# Patient Record
Sex: FEMALE | Race: BLACK OR AFRICAN AMERICAN | Employment: UNEMPLOYED | URBAN - METROPOLITAN AREA
[De-identification: names, ages, dates, MRNs, and addresses within clinical notes are randomized per-mention and may not be internally consistent; named-entity substitution may affect disease eponyms.]

---
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
## 2021-04-05 ENCOUNTER — APPOINTMENT (OUTPATIENT)
Dept: CT IMAGING | Age: 21
End: 2021-04-05
Payer: COMMERCIAL

## 2021-04-05 ENCOUNTER — HOSPITAL ENCOUNTER (EMERGENCY)
Age: 21
Discharge: ANOTHER HOSPITAL | End: 2021-04-06
Attending: EMERGENCY MEDICINE
Payer: COMMERCIAL

## 2021-04-05 ENCOUNTER — APPOINTMENT (OUTPATIENT)
Dept: GENERAL RADIOLOGY | Age: 21
End: 2021-04-05
Payer: COMMERCIAL

## 2021-04-05 DIAGNOSIS — F32.A DEPRESSION, UNSPECIFIED DEPRESSION TYPE: Primary | ICD-10-CM

## 2021-04-05 PROBLEM — F29 PSYCHOSIS (HCC): Status: ACTIVE | Noted: 2021-04-05

## 2021-04-05 LAB
A/G RATIO: 1.3 (ref 1.1–2.2)
ACETAMINOPHEN LEVEL: <5 UG/ML (ref 10–30)
ALBUMIN SERPL-MCNC: 4.7 G/DL (ref 3.4–5)
ALP BLD-CCNC: 61 U/L (ref 40–129)
ALT SERPL-CCNC: 16 U/L (ref 10–40)
AMPHETAMINE SCREEN, URINE: NORMAL
ANION GAP SERPL CALCULATED.3IONS-SCNC: 15 MMOL/L (ref 3–16)
AST SERPL-CCNC: 15 U/L (ref 15–37)
BACTERIA: ABNORMAL /HPF
BARBITURATE SCREEN URINE: NORMAL
BASOPHILS ABSOLUTE: 0.1 K/UL (ref 0–0.2)
BASOPHILS RELATIVE PERCENT: 0.7 %
BENZODIAZEPINE SCREEN, URINE: NORMAL
BILIRUB SERPL-MCNC: 0.4 MG/DL (ref 0–1)
BILIRUBIN URINE: ABNORMAL
BLOOD, URINE: ABNORMAL
BUN BLDV-MCNC: 15 MG/DL (ref 7–20)
CALCIUM SERPL-MCNC: 10 MG/DL (ref 8.3–10.6)
CANNABINOID SCREEN URINE: NORMAL
CHLORIDE BLD-SCNC: 102 MMOL/L (ref 99–110)
CLARITY: ABNORMAL
CO2: 25 MMOL/L (ref 21–32)
COCAINE METABOLITE SCREEN URINE: NORMAL
COLOR: ABNORMAL
COMMENT UA: ABNORMAL
CREAT SERPL-MCNC: 0.8 MG/DL (ref 0.6–1.1)
EOSINOPHILS ABSOLUTE: 0 K/UL (ref 0–0.6)
EOSINOPHILS RELATIVE PERCENT: 0.1 %
EPITHELIAL CELLS, UA: 18 /HPF (ref 0–5)
ETHANOL: NORMAL MG/DL (ref 0–0.08)
GFR AFRICAN AMERICAN: >60
GFR NON-AFRICAN AMERICAN: >60
GLOBULIN: 3.6 G/DL
GLUCOSE BLD-MCNC: 79 MG/DL (ref 70–99)
GLUCOSE BLD-MCNC: 95 MG/DL (ref 70–99)
GLUCOSE URINE: NEGATIVE MG/DL
HCG(URINE) PREGNANCY TEST: NEGATIVE
HCT VFR BLD CALC: 42.4 % (ref 36–48)
HEMOGLOBIN: 14.2 G/DL (ref 12–16)
KETONES, URINE: >=80 MG/DL
LEUKOCYTE ESTERASE, URINE: NEGATIVE
LYMPHOCYTES ABSOLUTE: 1.5 K/UL (ref 1–5.1)
LYMPHOCYTES RELATIVE PERCENT: 18.6 %
Lab: NORMAL
MCH RBC QN AUTO: 27.9 PG (ref 26–34)
MCHC RBC AUTO-ENTMCNC: 33.6 G/DL (ref 31–36)
MCV RBC AUTO: 83 FL (ref 80–100)
METHADONE SCREEN, URINE: NORMAL
MICROSCOPIC EXAMINATION: YES
MONOCYTES ABSOLUTE: 0.6 K/UL (ref 0–1.3)
MONOCYTES RELATIVE PERCENT: 8.2 %
MUCUS: ABNORMAL /LPF
NEUTROPHILS ABSOLUTE: 5.7 K/UL (ref 1.7–7.7)
NEUTROPHILS RELATIVE PERCENT: 72.4 %
NITRITE, URINE: NEGATIVE
OPIATE SCREEN URINE: NORMAL
OXYCODONE URINE: NORMAL
PDW BLD-RTO: 13.6 % (ref 12.4–15.4)
PERFORMED ON: NORMAL
PH UA: 5.5
PH UA: 5.5 (ref 5–8)
PHENCYCLIDINE SCREEN URINE: NORMAL
PLATELET # BLD: 277 K/UL (ref 135–450)
PMV BLD AUTO: 9.2 FL (ref 5–10.5)
POTASSIUM SERPL-SCNC: 3.4 MMOL/L (ref 3.5–5.1)
PROPOXYPHENE SCREEN: NORMAL
PROTEIN UA: 30 MG/DL
RBC # BLD: 5.1 M/UL (ref 4–5.2)
RBC UA: 2 /HPF (ref 0–4)
SALICYLATE, SERUM: <0.3 MG/DL (ref 15–30)
SARS-COV-2, NAAT: NOT DETECTED
SODIUM BLD-SCNC: 142 MMOL/L (ref 136–145)
SPECIFIC GRAVITY UA: >1.03 (ref 1–1.03)
TOTAL PROTEIN: 8.3 G/DL (ref 6.4–8.2)
URINE REFLEX TO CULTURE: ABNORMAL
URINE TYPE: ABNORMAL
UROBILINOGEN, URINE: 1 E.U./DL
WBC # BLD: 7.8 K/UL (ref 4–11)
WBC UA: 8 /HPF (ref 0–5)

## 2021-04-05 PROCEDURE — 87635 SARS-COV-2 COVID-19 AMP PRB: CPT

## 2021-04-05 PROCEDURE — 6370000000 HC RX 637 (ALT 250 FOR IP): Performed by: EMERGENCY MEDICINE

## 2021-04-05 PROCEDURE — 93005 ELECTROCARDIOGRAM TRACING: CPT | Performed by: EMERGENCY MEDICINE

## 2021-04-05 PROCEDURE — 71045 X-RAY EXAM CHEST 1 VIEW: CPT

## 2021-04-05 PROCEDURE — 70450 CT HEAD/BRAIN W/O DYE: CPT

## 2021-04-05 PROCEDURE — 84703 CHORIONIC GONADOTROPIN ASSAY: CPT

## 2021-04-05 PROCEDURE — 81001 URINALYSIS AUTO W/SCOPE: CPT

## 2021-04-05 PROCEDURE — 80179 DRUG ASSAY SALICYLATE: CPT

## 2021-04-05 PROCEDURE — 85025 COMPLETE CBC W/AUTO DIFF WBC: CPT

## 2021-04-05 PROCEDURE — 36415 COLL VENOUS BLD VENIPUNCTURE: CPT

## 2021-04-05 PROCEDURE — 6360000004 HC RX CONTRAST MEDICATION: Performed by: EMERGENCY MEDICINE

## 2021-04-05 PROCEDURE — 99283 EMERGENCY DEPT VISIT LOW MDM: CPT

## 2021-04-05 PROCEDURE — 80307 DRUG TEST PRSMV CHEM ANLYZR: CPT

## 2021-04-05 PROCEDURE — 80053 COMPREHEN METABOLIC PANEL: CPT

## 2021-04-05 PROCEDURE — 80143 DRUG ASSAY ACETAMINOPHEN: CPT

## 2021-04-05 PROCEDURE — 70498 CT ANGIOGRAPHY NECK: CPT

## 2021-04-05 PROCEDURE — 82077 ASSAY SPEC XCP UR&BREATH IA: CPT

## 2021-04-05 RX ORDER — ACETAMINOPHEN 500 MG
1000 TABLET ORAL ONCE
Status: COMPLETED | OUTPATIENT
Start: 2021-04-05 | End: 2021-04-05

## 2021-04-05 RX ADMIN — ACETAMINOPHEN 1000 MG: 500 TABLET ORAL at 23:36

## 2021-04-05 RX ADMIN — IOPAMIDOL 75 ML: 755 INJECTION, SOLUTION INTRAVENOUS at 19:57

## 2021-04-05 ASSESSMENT — PAIN SCALES - GENERAL: PAINLEVEL_OUTOF10: 8

## 2021-04-05 NOTE — ED PROVIDER NOTES
[facility]  eMERGENCY dEPARTMENT eNCOUnter      Pt Name: Familia Paniagua  MRN: [id number]  Maria Luisagfmicah 2000  Date of evaluation: 4/5/2021  Provider: Kim Jalloh MD    CHIEF COMPLAINT       Chief Complaint   Patient presents with    Aphasia     per mom, patient has been progressively slurring words since Friday; when asked if she knows what she wants to say something and is just unable to, she nods; \"in and out for two weeks (talk and then wont talk)\"         CRITICAL CARE TIME   Total Critical Care time was 0 minutes, excluding separately reportable procedures. There was a high probability of clinically significant/life threatening deterioration in the patient's condition which required my urgent intervention. HISTORY OF PRESENT ILLNESS  (Location/Symptom, Timing/Onset, Context/Setting, Quality, Duration, Modifying Factors, Severity.)   Familia Paniagua is a 24 y.o. female who presents to the emergency department with her mother stating that she has been acting normal for the last 2 weeks. She states she seems withdrawn. She is frequently found staring. She has noticed that some nights she does not seem to be sleeping. Is not been speaking as much as usual.  Her mother feels that on Friday she noticed her slurring her speech and she has been slurring it since then. She took her to [facility] today. Her mother states that they were there for so long that she decided to leave and bring her here. Her mother states she has been eating, but not as much as usual.  She is not aware of any drug use. She has a history of depression and is on Celexa. States her sister was ready to leave for school and saw her standing at a window staring. She came home a couple hours later after taking a test at school and she was still standing in the same spot staring out the window. When she arrived here the patient would not talk to me at all.   He would shake her file    Intimate partner violence     Fear of current or ex partner: Not on file     Emotionally abused: Not on file     Physically abused: Not on file     Forced sexual activity: Not on file   Other Topics Concern    Not on file   Social History Narrative    Not on file         PHYSICAL EXAM    (up to 7 for level 4, 8 or more for level 5)     ED Triage Vitals [04/05/21 1823]   BP Temp Temp Source Pulse Resp SpO2 Height Weight   (!) 168/98 97.8 °F (36.6 °C) Temporal 107 20 100 % -- --       General: Alert morbidly obese black female no acute distress. Head: Atraumatic and normocephalic. Eyes: No conjunctival injection. No pallor. Pupils equal round reactive. Extraocular movements are intact. ENT: TMs are normal.  Nose is clear. Oropharynx is moist without erythema. No facial asymmetry. Neck: Supple, nontender, no adenopathy. Heart: Regular rate and rhythm. No murmurs or gallops noted. Lungs: Breath sounds equal bilaterally and clear. Abdomen: Soft, nondistended, obese. No apparent tenderness. No palpable masses organomegaly. Musculoskeletal: No lower extremity edema. Intact symmetrical distal pulses. Skin: Warm and dry, good turgor. No pallor or cyanosis. No diaphoresis. Neuro: Awake, alert, cooperates for exam.  She will not verbalize. Symmetrical reactive pupils. Intact extraocular movements. No facial asymmetry. Symmetrical motor function upper and lower extremities. Ambulates normally. Mental status: Per mother no expressions of suicidal homicidal ideations. Cannot assess further because she refuses to speak. DIFFERENTIAL DIAGNOSIS   Differential includes but is not limited to CVA, drug use/abuse, major depression, other psychiatric disorder. DIAGNOSTIC RESULTS     EKG: All EKG's are interpreted by Erwin Marie MD in the absence of a cardiologist.    Sinus tachycardia, rate of 104, no acute ST-T wave changes.   Rhythm strip shows sinus tachycardia with a rate of 104, DE interval 148 ms, QRS 72 ms with no other ectopy as interpreted by me. No old EKG available for comparison. RADIOLOGY:   Non-plain film images such as CT, Ultrasound and MRI are read by the radiologist. Plain radiographic images are visualized and preliminarily interpreted Bobbi Wright MD with the below findings:      Interpretation per the Radiologist below, if available at the time of this note:     [address]   Final Result   1. No acute intracranial abnormality. 2. Unremarkable CTA of the head and neck. CT HEAD WO CONTRAST   Final Result   1. No acute intracranial abnormality. 2. Unremarkable CTA of the head and neck.          XR CHEST PORTABLE   Final Result   No acute abnormality               ED BEDSIDE ULTRASOUND:   Performed by ED Physician - none    LABS:  Labs Reviewed   COMPREHENSIVE METABOLIC PANEL - Abnormal; Notable for the following components:       Result Value    Potassium 3.4 (*)     Total Protein 8.3 (*)     All other components within normal limits    Narrative:     Performed at:  [facility]  [address], [place] [address]   Phone [phone]   URINE RT REFLEX TO CULTURE - Abnormal; Notable for the following components:    Clarity, UA CLOUDY (*)     Bilirubin Urine SMALL (*)     Ketones, Urine >=80 (*)     Blood, Urine LARGE (*)     Protein, UA 30 (*)     All other components within normal limits    Narrative:     Performed at:  [facility]  [address], [place] [address]   Phone [phone]   ACETAMINOPHEN LEVEL - Abnormal; Notable for the following components:    Acetaminophen Level <5 (*)     All other components within normal limits    Narrative:     Performed at:  [facility]  [address], [place] [employer] [employer] [address]   Phone [phone]   SALICYLATE LEVEL - Abnormal; Notable for the following components: Salicylate, Serum <6.6 (*)     All other components within normal limits    Narrative:     Performed at:  [address] [employer] [address]   Phone [phone]   MICROSCOPIC URINALYSIS - Abnormal; Notable for the following components:    Mucus, UA 2+ (*)     Bacteria, UA 2+ (*)     WBC, UA 8 (*)     Epithelial Cells, UA 18 (*)     All other components within normal limits    Narrative:     Performed at:  [address] [facility] [place] [address]   Phone [phone], RAPID    Narrative:     Performed at:  [address] [facility] [employer] [place] [address]   Phone [phone]   CBC WITH AUTO DIFFERENTIAL    Narrative:     Performed at:  [facility]  [address] [place] [address] [employer] [place] [address]   Phone [phone]   PREGNANCY, URINE    Narrative:     Performed at:  [address] [facility] [employer] [address]   Phone [phone]   ETHANOL    Narrative:     Performed at:  [facility]  [address] [place] [address] [facility] [employer] [address]   Phone [phone]   URINE DRUG SCREEN    Narrative:     Performed at:  [facility]  [address] [place] [address] [employer] [address]   Phone [phone]   POCT GLUCOSE    Narrative:     Performed at:  [facility]  [address] [place] [address] [facility] [employer] [address]   Phone [phone]       All other labs were within normal range or not returned as of this dictation. EMERGENCY DEPARTMENT COURSE and DIFFERENTIAL DIAGNOSIS/MDM:   Vitals:    Vitals:    04/05/21 1823   BP: (!) 168/98   Pulse: 107   Resp: 20   Temp: 97.8 °F (36.6 °C)   TempSrc: Temporal   SpO2: 100%       This patient presents as above with symptoms that started about 2 weeks ago.   She does have a past history of depression, but her mother says she has never had symptoms like this. She has become more withdrawn, she is not speaking as much. Not eating as much. Sometimes at night she is not sleeping. They find her standing and staring at times. Over the last few days she has been verbalizing less and less. When she came in initially I was not able to her to speak to me. When I went in to reevaluate her she would whisper very softly to the point where I could just barely understand her. Her medical work-up is unremarkable including a CT head, CT angiogram head and neck. Drug screen is negative. I suspect the symptoms are related to depression. I will consult psychiatry for admission for further evaluation and treatment. Test results, diagnosis, and treatment plan were discussed with the patient and her mother. They understand the treatment plan and need for admission and are agreeable. The patient is medically cleared for psychiatric admission. 2300:  Discussed with Dr. Hubert Joyner at shift change. He will disposition when accepted by Psychiatry. CONSULTS:  IP CONSULT TO PSYCHIATRY    PROCEDURES:  None    FINAL IMPRESSION      1. Depression, unspecified depression type          DISPOSITION/PLAN   DISPOSITION        PATIENT REFERRED TO:  No follow-up provider specified.     DISCHARGE MEDICATIONS:  New Prescriptions    No medications on file       (Please note that portions of this note were completed with a voice recognition program.  Efforts were made to edit the dictations but occasionally words are mis-transcribed.)    Eric St MD  Attending Emergency Physician        Fidelia Barnett MD  04/05/21 7307       Fidelia Barnett MD  04/05/21 0520

## 2021-04-06 ENCOUNTER — HOSPITAL ENCOUNTER (INPATIENT)
Age: 21
LOS: 6 days | Discharge: HOME | DRG: 750 | End: 2021-04-12
Attending: PSYCHIATRY & NEUROLOGY | Admitting: PSYCHIATRY & NEUROLOGY
Payer: COMMERCIAL

## 2021-04-06 VITALS
OXYGEN SATURATION: 100 % | TEMPERATURE: 97.8 F | HEART RATE: 120 BPM | WEIGHT: 293 LBS | SYSTOLIC BLOOD PRESSURE: 174 MMHG | BODY MASS INDEX: 49.38 KG/M2 | RESPIRATION RATE: 15 BRPM | DIASTOLIC BLOOD PRESSURE: 88 MMHG

## 2021-04-06 DIAGNOSIS — R82.4 KETONURIA: ICD-10-CM

## 2021-04-06 DIAGNOSIS — N76.0 BACTERIAL VAGINOSIS: ICD-10-CM

## 2021-04-06 DIAGNOSIS — E66.01 MORBID OBESITY (HCC): ICD-10-CM

## 2021-04-06 DIAGNOSIS — R03.0 ELEVATED BP WITHOUT DIAGNOSIS OF HYPERTENSION: ICD-10-CM

## 2021-04-06 DIAGNOSIS — B96.89 BACTERIAL VAGINOSIS: ICD-10-CM

## 2021-04-06 DIAGNOSIS — F20.81 SCHIZOPHRENIFORM DISORDER (HCC): Primary | ICD-10-CM

## 2021-04-06 LAB
BACTERIA WET PREP: ABNORMAL
CLUE CELLS: ABNORMAL
EKG ATRIAL RATE: 104 BPM
EKG DIAGNOSIS: NORMAL
EKG P AXIS: 57 DEGREES
EKG P-R INTERVAL: 148 MS
EKG Q-T INTERVAL: 346 MS
EKG QRS DURATION: 72 MS
EKG QTC CALCULATION (BAZETT): 454 MS
EKG R AXIS: 48 DEGREES
EKG T AXIS: 20 DEGREES
EKG VENTRICULAR RATE: 104 BPM
EPITHELIAL CELLS WET PREP: ABNORMAL
RBC WET PREP: ABNORMAL
SOURCE WET PREP: ABNORMAL
TRICHOMONAS PREP: ABNORMAL
WBC WET PREP: ABNORMAL
YEAST WET PREP: ABNORMAL

## 2021-04-06 PROCEDURE — 6360000002 HC RX W HCPCS: Performed by: PSYCHIATRY & NEUROLOGY

## 2021-04-06 PROCEDURE — 87491 CHLMYD TRACH DNA AMP PROBE: CPT

## 2021-04-06 PROCEDURE — 1240000000 HC EMOTIONAL WELLNESS R&B

## 2021-04-06 PROCEDURE — 6370000000 HC RX 637 (ALT 250 FOR IP): Performed by: PSYCHIATRY & NEUROLOGY

## 2021-04-06 PROCEDURE — 87591 N.GONORRHOEAE DNA AMP PROB: CPT

## 2021-04-06 PROCEDURE — 87210 SMEAR WET MOUNT SALINE/INK: CPT

## 2021-04-06 PROCEDURE — 93010 ELECTROCARDIOGRAM REPORT: CPT | Performed by: INTERNAL MEDICINE

## 2021-04-06 RX ORDER — ALBUTEROL SULFATE 90 UG/1
4 AEROSOL, METERED RESPIRATORY (INHALATION) EVERY 4 HOURS PRN
Status: ON HOLD | COMMUNITY
Start: 2021-03-09 | End: 2021-04-12 | Stop reason: HOSPADM

## 2021-04-06 RX ORDER — LORAZEPAM 1 MG/1
1 TABLET ORAL EVERY 4 HOURS PRN
Status: DISCONTINUED | OUTPATIENT
Start: 2021-04-06 | End: 2021-04-10

## 2021-04-06 RX ORDER — IBUPROFEN 400 MG/1
400 TABLET ORAL EVERY 6 HOURS PRN
Status: DISCONTINUED | OUTPATIENT
Start: 2021-04-06 | End: 2021-04-10

## 2021-04-06 RX ORDER — CLONIDINE HYDROCHLORIDE 0.1 MG/1
0.1 TABLET ORAL ONCE
Status: COMPLETED | OUTPATIENT
Start: 2021-04-06 | End: 2021-04-06

## 2021-04-06 RX ORDER — LORAZEPAM 2 MG/ML
1 INJECTION INTRAMUSCULAR EVERY 4 HOURS PRN
Status: DISCONTINUED | OUTPATIENT
Start: 2021-04-06 | End: 2021-04-10

## 2021-04-06 RX ORDER — OLANZAPINE 10 MG/1
10 INJECTION, POWDER, LYOPHILIZED, FOR SOLUTION INTRAMUSCULAR EVERY 4 HOURS PRN
Status: DISCONTINUED | OUTPATIENT
Start: 2021-04-06 | End: 2021-04-10

## 2021-04-06 RX ORDER — ACETAMINOPHEN 325 MG/1
650 TABLET ORAL EVERY 4 HOURS PRN
Status: DISCONTINUED | OUTPATIENT
Start: 2021-04-06 | End: 2021-04-10

## 2021-04-06 RX ORDER — OLANZAPINE 10 MG/1
10 TABLET ORAL EVERY 4 HOURS PRN
Status: DISCONTINUED | OUTPATIENT
Start: 2021-04-06 | End: 2021-04-10

## 2021-04-06 RX ORDER — TRAZODONE HYDROCHLORIDE 50 MG/1
50 TABLET ORAL NIGHTLY PRN
Status: DISCONTINUED | OUTPATIENT
Start: 2021-04-06 | End: 2021-04-12 | Stop reason: HOSPADM

## 2021-04-06 RX ADMIN — CLONIDINE HYDROCHLORIDE 0.1 MG: 0.1 TABLET ORAL at 20:23

## 2021-04-06 RX ADMIN — IBUPROFEN 400 MG: 400 TABLET, FILM COATED ORAL at 19:38

## 2021-04-06 RX ADMIN — LORAZEPAM 1 MG: 2 INJECTION INTRAMUSCULAR; INTRAVENOUS at 14:15

## 2021-04-06 RX ADMIN — LORAZEPAM 1 MG: 1 TABLET ORAL at 19:38

## 2021-04-06 ASSESSMENT — SLEEP AND FATIGUE QUESTIONNAIRES
DO YOU USE A SLEEP AID: COMMENT
DO YOU HAVE DIFFICULTY SLEEPING: COMMENT

## 2021-04-06 ASSESSMENT — PAIN SCALES - GENERAL
PAINLEVEL_OUTOF10: 0
PAINLEVEL_OUTOF10: 0
PAINLEVEL_OUTOF10: 6

## 2021-04-06 ASSESSMENT — LIFESTYLE VARIABLES: HISTORY_ALCOHOL_USE: NO

## 2021-04-06 NOTE — BH NOTE
Patients mom on unit to visit. She states \"patient is acting like this because she is here. \"  States up until arriving to [facility] patient was talking, walking around, eating, and engaging. She was alert and oriented prior to transport.

## 2021-04-06 NOTE — BH NOTE
Patient arrives to [facility]. She is not responding to questioning, has psychomotor retardation, starring with darting eyes. Dr. Mckeon Ion aware and new order for Ativan 1 mg given. Patient allowed this writer to administer in right deltoid without incident.

## 2021-04-06 NOTE — ED NOTES
Placed a breakfast safety tray order in for patient.       Cari READ [facility] ELSA  04/06/21 0750

## 2021-04-06 NOTE — ED TRIAGE NOTES
Ppatient to ED and has been progressively slurring words since Friday; when asked if she knows what she wants to say something and is just unable to, she nods; \"in and out for two weeks (talk and then wont talk)\"  Pt is ambulatory but is slow to respond with physical movements to command.

## 2021-04-06 NOTE — ED NOTES
Bedside report given to [facility].      Colonel Funez  04/06/21 7814 Patient calls to reschedule colonoscopy, she is rescheduled, new instructions mailed to patient, hospital is notified.

## 2021-04-06 NOTE — ED NOTES
Patients breakfast safety tray arrived to the room. Set the tray in front of patient, asking if she would like to eat. She slightly nodded her head but no other acknowledgement.       Luciana COX ELSA  04/06/21 8381

## 2021-04-06 NOTE — ED NOTES
SBAR to [address] [facility] ambulance, and [place] at [facility],  all questions answered. Patient being transferred to [facility]. Patient breathing regular, no distress, is alert & oriented to person, place and time. Sent to room 2320 at [facility].      Marzena Hill RN  04/06/21 7720

## 2021-04-06 NOTE — ED NOTES
Mom going home and will return in the morning. Mom given number to call anytime tonight. Moms number is [phone]. Advised mom we will call if any changes on transportation.       Caleb Gasca  04/06/21 0300

## 2021-04-06 NOTE — GROUP NOTE
Group Therapy Note    Date: 4/6/2021    Group Start Time: 3775  Group End Time: 2142  Group Topic: Healthy Living/Wellness    [address] Javier Fuentes RN        Group Therapy Note    Attendees: 7           Patient's Goal:  Understanding psychiatric medication uses, side effect, adverse rx's in order to be better advocates for their own care. Status After Intervention:  Improved    Participation Level:  Active Listener and Interactive    Participation Quality: Appropriate, Attentive, Sharing and Supportive      Speech:  normal      Thought Process/Content: Logical  Linear      Affective Functioning: Congruent      Mood: euthymic      Level of consciousness:  Alert, Oriented x4 and Attentive      Response to Learning: Able to verbalize current knowledge/experience, Able to verbalize/acknowledge new learning, Able to retain information, Capable of insight and Progressing to goal      Endings: None Reported    Modes of Intervention: Education      Discipline Responsible: Registered Nurse      Signature:  Ernestine Candelario RN

## 2021-04-06 NOTE — ED NOTES
Patient loaded on to transports stretcher with no issues or complaints at this time.      Elder Jennings [facility]  04/06/21 1130

## 2021-04-06 NOTE — BH NOTE
`[facility]  Admission Note     Admission Type:   Admission Type: Voluntary    Reason for admission:  Reason for Admission: psychosis, catanoia    PATIENT STRENGTHS:  Strengths: Positive Support, Employment    Patient Strengths and Limitations:  Limitations: Tendency to isolate self(current isolation r/t catatonia)    Addictive Behavior:   Addictive Behavior  In the past 3 months, have you felt or has someone told you that you have a problem with:  : None  Do you have a history of Chemical Use?: No  Do you have a history of Alcohol Use?: No  Do you have a history of Street Drug Abuse?: No  Histroy of Prescripton Drug Abuse?: No    Medical Problems:   Past Medical History:   Diagnosis Date    Depression     Hypertension        Status EXAM:  Status and Exam  Normal: No  Facial Expression: Flat  Affect: Congruent  Level of Consciousness: Alert  Mood:Normal: No  Mood: Empty  Motor Activity:Normal: No  Motor Activity: Decreased  Interview Behavior: Evasive  Preception: [place] to Person  Attention:Normal: No  Attention: Distractible  Thought Processes: Blocking  Thought Content:Normal: No  Thought Content: Poverty of Content, Preoccupations  Hallucinations:  Auditory (Comment)(pt nodded \"yes\")  Delusions: No(none voiced)  Memory:Normal: No  Memory: Poor Recent, Poor Remote  Insight and Judgment: No  Insight and Judgment: Poor Judgment, Poor Insight  Present Suicidal Ideation: No  Present Homicidal Ideation: No    Tobacco Screening:  Practical Counseling, on admission, gisell X, if applicable and completed (first 3 are required if patient doesn't refuse):            ( )  Recognizing danger situations (included triggers and roadblocks)     ( )  Coping skills (new ways to manage stress, exercise, relaxation techniques, changing routine, distraction)                                                 ( )  Basic information about quitting (benefits of quitting, techniques in how to quit, available resources  ( ) Referral for counseling faxed to [facility]             ( ) Patient refused counseling  (X) Patient has not smoked in the last 30 days    Metabolic Screening:    No results found for: LABA1C    No results found for: CHOL  No results found for: TRIG  No results found for: HDL  No components found for: LDLCAL  No results found for: LABVLDL      There is no height or weight on file to calculate BMI. BP Readings from Last 2 Encounters:   04/06/21 (!) 155/99   04/06/21 (!) 174/88           Pt admitted with followings belongings:  Dentures: None  Vision - Corrective Lenses: Glasses  Hearing Aid: None  Jewelry: None  Clothing: Footwear, Jacket / coat, Pants, Shirt, Socks, Undergarments (Comment)  Were All Patient Medications Collected?: Not Applicable(celexa sent home with pt mother)  Other Valuables: None     Valuables sent home with patients mom. Patient's home medications were verified. Patient oriented to surroundings and program expectations and copy of patient rights given. Received admission packet:  yes. Consents reviewed, signed no, unable to participate in signing paperwork at this time. Signed voluntary admission prior to transport to [facility]. Patient verbalize understanding:  yes.     Patient education on precautions: yes                   Melissa Perez RN

## 2021-04-06 NOTE — BH NOTE
Attempted to complete psychosocial assessment. Patient appeared sedated and unable to engage in interview. Will attempt tomorrow.

## 2021-04-06 NOTE — ED NOTES
Pt's mom called for update on patient. This nurse informed her that the patient would be transported to [place] around noon today and that she was stable and sleeping. Pt's mother informed this nurse that she would be coming to sit with her soon.      Jose Raymond RN  04/06/21 6563

## 2021-04-06 NOTE — BH NOTE
Patient requested one-on-one with this writer. Patient became tearful; unable to verbalize what was bothering her. Encouraged patient to write her feeling down. Patient's RN Micaela Puri notified. Nightshift RN notified.

## 2021-04-07 PROBLEM — F20.81 SCHIZOPHRENIFORM DISORDER (HCC): Status: ACTIVE | Noted: 2021-04-05

## 2021-04-07 LAB
C TRACH DNA GENITAL QL NAA+PROBE: NEGATIVE
CHOLESTEROL, TOTAL: 205 MG/DL (ref 0–199)
HDLC SERPL-MCNC: 37 MG/DL (ref 40–60)
LDL CHOLESTEROL CALCULATED: 154 MG/DL
N. GONORRHOEAE DNA: NEGATIVE
TRIGL SERPL-MCNC: 72 MG/DL (ref 0–150)
TSH SERPL DL<=0.05 MIU/L-ACNC: 2.75 UIU/ML (ref 0.43–4)
VLDLC SERPL CALC-MCNC: 14 MG/DL

## 2021-04-07 PROCEDURE — 1240000000 HC EMOTIONAL WELLNESS R&B

## 2021-04-07 PROCEDURE — 6370000000 HC RX 637 (ALT 250 FOR IP): Performed by: PHYSICIAN ASSISTANT

## 2021-04-07 PROCEDURE — 36415 COLL VENOUS BLD VENIPUNCTURE: CPT

## 2021-04-07 PROCEDURE — 84443 ASSAY THYROID STIM HORMONE: CPT

## 2021-04-07 PROCEDURE — 6370000000 HC RX 637 (ALT 250 FOR IP): Performed by: PSYCHIATRY & NEUROLOGY

## 2021-04-07 PROCEDURE — 83036 HEMOGLOBIN GLYCOSYLATED A1C: CPT

## 2021-04-07 PROCEDURE — 99223 1ST HOSP IP/OBS HIGH 75: CPT | Performed by: PSYCHIATRY & NEUROLOGY

## 2021-04-07 PROCEDURE — 99222 1ST HOSP IP/OBS MODERATE 55: CPT | Performed by: PHYSICIAN ASSISTANT

## 2021-04-07 PROCEDURE — 80061 LIPID PANEL: CPT

## 2021-04-07 RX ORDER — RISPERIDONE 0.25 MG/1
0.5 TABLET, FILM COATED ORAL 2 TIMES DAILY
Status: DISCONTINUED | OUTPATIENT
Start: 2021-04-07 | End: 2021-04-08

## 2021-04-07 RX ORDER — METRONIDAZOLE 250 MG/1
500 TABLET ORAL EVERY 12 HOURS SCHEDULED
Status: DISCONTINUED | OUTPATIENT
Start: 2021-04-07 | End: 2021-04-12 | Stop reason: HOSPADM

## 2021-04-07 RX ORDER — LORAZEPAM 1 MG/1
1 TABLET ORAL ONCE
Status: COMPLETED | OUTPATIENT
Start: 2021-04-07 | End: 2021-04-07

## 2021-04-07 RX ADMIN — RISPERIDONE 0.5 MG: 0.25 TABLET ORAL at 21:18

## 2021-04-07 RX ADMIN — IBUPROFEN 400 MG: 400 TABLET, FILM COATED ORAL at 03:00

## 2021-04-07 RX ADMIN — METRONIDAZOLE 500 MG: 250 TABLET ORAL at 21:18

## 2021-04-07 RX ADMIN — LORAZEPAM 1 MG: 1 TABLET ORAL at 03:00

## 2021-04-07 RX ADMIN — LORAZEPAM 1 MG: 1 TABLET ORAL at 12:57

## 2021-04-07 RX ADMIN — RISPERIDONE 0.5 MG: 0.25 TABLET ORAL at 15:07

## 2021-04-07 NOTE — H&P
Psychiatry History and Physical     Admission Date:    4/6/2021    Identification: domiciled and never- 18yo without previous psychiatric history who was brought to [facility] ED by her mother with new-onset odd behavior. SOI:  Patient; ED record. CC: patient not verbal     HPI:   Per the ED physician's note:  Beatriz Bermudez is a 24 y.o. female who presents to the emergency department with her mother stating that she has been acting normal for the last 2 weeks. She states she seems withdrawn. She is frequently found staring. She has noticed that some nights she does not seem to be sleeping. Is not been speaking as much as usual.  Her mother feels that on Friday she noticed her slurring her speech and she has been slurring it since then. She took her to [facility] today. Her mother states that they were there for so long that she decided to leave and bring her here. Her mother states she has been eating, but not as much as usual.  She is not aware of any drug use. She has a history of depression and is on Celexa. States her sister was ready to leave for school and saw her standing at a window staring. She came home a couple hours later after taking a test at school and she was still standing in the same spot staring out the window. When she arrived here the patient would not talk to me at all. He would shake her head yes and no. She would cooperate for exam and lift and move her arms as I indicated, but I cannot get her to verbalize at all. When I met with the patient today, she was unable to provide a history. She seemed to struggle to speak and, when she did, it was barely audible. She was able to answer some questions with nods but that tappered off too. She made little eye contact, had long latencies, and seemed blocked. She nodded yes when asked if she is fearful. She nodded yes when asked if she is willing to try medicine.       Past Psychiatric History:    Previous Diagnoses: mother told ED she has a history of depression  PreviousHosp: none known  OutpatientTx: none known      Med Trials: celexa  Suicidality: none known  History of violence: none known    Past Medical History:  Past Medical History:   Diagnosis Date    Depression     Hypertension      Home Medications:  Prior to Admission medications    Medication Sig Start Date End Date Taking? Authorizing Provider   albuterol sulfate  (90 Base) MCG/ACT inhaler Inhale 4 puffs into the lungs every 4 hours as needed 3/9/21  Yes Historical Provider, MD       Chemical Dependency History:   None known     Family Mental Health Hx:    None known    Social Hx:   Lives with mother  In school     Current Medications Ordered:   risperiDONE  0.5 mg Oral BID    metroNIDAZOLE  500 mg Oral 2 times per day      PRN Meds: acetaminophen, ibuprofen, magnesium hydroxide, traZODone, LORazepam **OR** LORazepam, OLANZapine **OR** OLANZapine, sterile water     ROS:  does not describe or endorse recent headaches, changes in vision, shortness of breath, chest pain, neurological problems, skin problems, muscle aches, GI problems, or bleeding problems, and was moving her extremities without difficulty. PE:    BP (!) 154/90 Comment: 3rd attempt, manual  Pulse 89   Temp 98.4 °F (36.9 °C) (Temporal)   Resp 18   LMP  (LMP Unknown)   SpO2 100%   Breastfeeding No       Motor / Gait: slowed gait and station  AIMS: 0    Mental Status Examination:    Appearance: fair hygiene, slowed. Behavior/Attitude toward examiner:  poor eye contact  Speech:  Nearly nonverbal; poverty   Mood:  No response   Affect:  FLAT  Thought processes:  Blocked? Thought Content: no SI or HI voiced; +paranoid  Perceptions: ? RTIS  Attention: impaired   Abstraction: impaired  Cognition: unable to assess   Insight: impaired    Judgment: impaired     LAB: Reviewed all labs obtained during admission to date.       Formulation: domiciled and never- 18yo without previous psychiatric history who was brought to [facility] ED by her mother with new-onset odd behavior. The patients presentation is consistent with catatonia. Differential includes schizophreniform disorder and major depressive disorder, recurrent, severe, with psychotic features. Dx:   Primary Psychiatric (DSM V) Diagnosis: schizophreniform disorder  Secondary Psychiatric (DSM V) Diagnoses: h/o depression  Chemical Dependency Diagnoses: none    Plan:    1. Admit to inpatient adult psychiatry for stabilization and treatment. 2. On admission, start Risperidone 0.5 mg BID. Give 1mg ativan test dose for catatonia. Order q15min checks for safety, programming, and prn medication for anxiety, agitation, and insomnia. 3. Internal medicine consult for admission. 4. Collateral information from family for diagnostic clarification and care coordination. 5. ELOS 8-10 days. Voluntary. Spent > 70 minutes face to face with patient of which >50% was spent counseling and providing education regarding diagnosis, treatment options, and prognosis.     Irene Collins MD  Staff Psychiatrist

## 2021-04-07 NOTE — PLAN OF CARE
Problem: Altered Mood, Psychotic Behavior:  Goal: Ability to interact with others will improve  Description: Ability to interact with others will improve  Outcome: Ongoing   Patient after approximately one hour of ativan being given was able to answer a couple questions asked by nurse. Patient visited with her aunt this afternoon, visit went well. Mother was given an update per writer.

## 2021-04-07 NOTE — H&P
Hospital Medicine History & Physical      PCP: Jamari Garcia    Date of Admission: 4/6/2021    Date of Service: Pt seen/examined on 4/7/2021     Chief Complaint:  No chief complaint on file. History Of Present Illness: The patient is a 24 y.o. female with a PMH of a PMH of Depression who presented to [facility] for acute psychosis. Patient was seen and evaluated in the ED by the ED medical provider, patient was medically cleared for admission to [facility] at [facility]. This note serves as an admission medical H&P.   PCP: Pt unable to recall provider name  Tobacco Use: pt does not respond  EtOH Use: pt does not respond  Illicit Drug Use: pt does not respond. Pt does not report any medical concerns. Pt speaks very softly and is very difficult to understand at time. I was unable to get very much information from her as she either did not respond or whispered so softly that it was difficult to understand. Pt did report to RN that she is currently on her period. UA noted large blood and ketones. Past Medical History:        Diagnosis Date    Depression     Hypertension        Past Surgical History:    History reviewed. No pertinent surgical history. Medications Prior to Admission:    Prior to Admission medications    Medication Sig Start Date End Date Taking? Authorizing Provider   albuterol sulfate  (90 Base) MCG/ACT inhaler Inhale 4 puffs into the lungs every 4 hours as needed 3/9/21  Yes Historical Provider, MD       Allergies:  Patient has no known allergies. Social History:  The patient currently lives with family. TOBACCO:   reports that she has never smoked. She has never used smokeless tobacco.  ETOH:   reports no history of alcohol use. Family History:   Positive as follows:    History reviewed. No pertinent family history.     REVIEW OF SYSTEMS:     Constitutional: Negative for fever   HENT: Negative for sore throat   Eyes: Negative for redness   Respiratory: Negative  for dyspnea, cough   Cardiovascular: Negative for chest pain   Gastrointestinal: Negative for vomiting, diarrhea   Genitourinary: Negative for hematuria   Musculoskeletal: Negative for arthralgias   Skin: Negative for rash   Neurological: Negative for syncope   Hematological: Negative for adenopathy   Psychiatric/Behavorial: Negative for anxiety    PHYSICAL EXAM:    BP (!) 154/90 Comment: 3rd attempt, manual  Pulse 89   Temp 98.4 °F (36.9 °C) (Temporal)   Resp 18   LMP  (LMP Unknown)   SpO2 100%   Breastfeeding No   Gen: No distress. Alert. Pedro Luis Kruse morbidly obese AA female, speaks very softly, often does not respond to questions  Eyes: PERRL. No sclera icterus. No conjunctival injection. ENT: No discharge. Pharynx clear. Neck:  Trachea midline. Resp: No accessory muscle use. No crackles. No wheezes. No rhonchi. On RA  CV: Regular rate. Regular rhythm. No murmur. No rub. No edema. GI: Soft, obese, Non-tender. Non-distended. Normal bowel sounds. Skin: Warm and dry. No rash on exposed extremities. M/S: Ambulates independently. Neuro: Awake. Grossly nonfocal, CN II-XII intact, moves all extremities, follows some commands    Psych: Defer to psychiatry. CBC:   Recent Labs     04/05/21 1907   WBC 7.8   HGB 14.2   HCT 42.4   MCV 83.0        BMP:   Recent Labs     04/05/21 1907      K 3.4*      CO2 25   BUN 15   CREATININE 0.8     LIVER PROFILE:   Recent Labs     04/05/21 1907   AST 15   ALT 16   BILITOT 0.4   ALKPHOS 61     PT/INR: No results for input(s): PROTIME, INR in the last 72 hours. APTT: No results for input(s): APTT in the last 72 hours.   UA:  Recent Labs     04/05/21  1841   COLORU DK YELLOW   PHUR 5.5  5.5   WBCUA 8*   RBCUA 2   MUCUS 2+*   BACTERIA 2+*   CLARITYU CLOUDY*   SPECGRAV >1.030   LEUKOCYTESUR Negative   UROBILINOGEN 1.0   BILIRUBINUR SMALL*   BLOODU LARGE*   GLUCOSEU Negative      U/A:    Lab Results   Component Value Date    COLORU DK YELLOW 04/05/2021 WBCUA 8 04/05/2021    RBCUA 2 04/05/2021    MUCUS 2+ 04/05/2021    BACTERIA 2+ 04/05/2021    CLARITYU CLOUDY 04/05/2021    SPECGRAV >1.030 04/05/2021    LEUKOCYTESUR Negative 04/05/2021    BLOODU LARGE 04/05/2021    GLUCOSEU Negative 04/05/2021     CULTURES  None     RADIOLOGY  None    Pertinent previous results reviewed   EKG  Sinus tachycardia rate of 104  Poor data quality, interpretation may be adversely affected  Otherwise normal ECG  likely  No previous ECGs available  Confirmed by [facility] MD, Pauline Seats ([id number]) on 4/6/2021 9:54:53 AM    CXR 4/5/2021  No acute abnormality    CTA Head and Neck 4/5/2021  1. No acute intracranial abnormality. 2. Unremarkable CTA of the head and neck. 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 - Complicating assessment and treatment. Placing patient at risk for multiple co-morbidities as well as early death and contributing to the patient's presentation.   - Counseled on weight loss. Pt has no medical complaints at this time. Pt was informed that they may have [facility] contact us should any medical concerns arise during this admission.     Flower Meyers PA-C 2:00 PM 4/7/2021

## 2021-04-07 NOTE — PROGRESS NOTES
Pt received PRN ativan 1mg for anxiety and motrin for pain. Pt is tearful and anxious with poverty of speech when asked what is wrong. Pt also complaining of pain in right leg and urinary pain.

## 2021-04-07 NOTE — ED PROVIDER NOTES
Patient was signed out to me by Dr. Glo Sutherland. Patient awaiting placement at [facility]. Patient is a 73-year-old female that came in with flat affect and really not giving a lot of information. Per mom patient has depression and potentially needs placement for psych. Mom and patient voluntarily want to go to [facility]. Patient would not answer my questions when asking about suicidal ideation or homicidal ideation. Thus making it difficult to fully evaluate her psychiatric mental status. She is medically cleared. As mom is very concerned about patient's depression and patient really unwilling to give a significant history Dr. Glo Sutherland decided that patient should be inpatient psych. Patient does not appear to be able to take care of self and therefore could potentially be a danger to self. Patient to be transferred to [facility]. Both mom and patient are okay with this.     PE  Nontoxic well-appearing  Real rate and rhythm  No focal deficits      Vitals:    04/06/21 1102 04/06/21 1116 04/06/21 1118 04/06/21 1132   BP: (!) 173/85 (!) 166/89 (!) 166/89 (!) 174/88   Pulse:   120    Resp:   15    Temp:       TempSrc:       SpO2:   100%    Weight:         Transfer to inpatient psych           Jailene Castaneda MD  04/07/21 0605

## 2021-04-07 NOTE — PROGRESS NOTES
Occupational Therapy      Received OT orders. Hold OT eval for Psychiatrist note.     Goyo Salcido, OTR/L  #[id number]
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
## 2021-04-07 NOTE — PLAN OF CARE
Problem: Altered Mood, Psychotic Behavior:  Goal: Able to verbalize reality based thinking  Description: Able to verbalize reality based thinking  Outcome: Ongoing  Note: Pt denies SI, HI. Pt endorses auditory hallucinations but does not elaborate, poverty of speech noted.

## 2021-04-07 NOTE — FLOWSHEET NOTE
04/06/21 1945   Provider Notification   Reason for Communication Review case   Provider Name Dr. Pelaez Lipoma   Provider Notification Physician   Method of Communication Call   Response See orders   Notification Time [address] for increased /116, , Temp 100.2 and c/o urinary pain, abn u/a.  N.O. clonidine 0.1 once.

## 2021-04-07 NOTE — PROGRESS NOTES
Behavioral Services  Medicare Certification Upon Admission    I certify that this patient's inpatient psychiatric hospital admission is medically necessary for:    [x] (1) Treatment which could reasonably be expected to improve this patient's condition,       [x] (2) Or for diagnostic study;     AND     [x](2) The inpatient psychiatric services are provided while the individual is under the care of a physician and are included in the individualized plan of care.     Estimated length of stay/service 8-10 days    Plan for post-hospital care incomplete     Electronically signed by Perri Drew, MD on 4/7/2021 at 3:02 PM

## 2021-04-08 LAB
ESTIMATED AVERAGE GLUCOSE: 114 MG/DL
HBA1C MFR BLD: 5.6 %

## 2021-04-08 PROCEDURE — 99233 SBSQ HOSP IP/OBS HIGH 50: CPT | Performed by: PSYCHIATRY & NEUROLOGY

## 2021-04-08 PROCEDURE — 1240000000 HC EMOTIONAL WELLNESS R&B

## 2021-04-08 PROCEDURE — 6370000000 HC RX 637 (ALT 250 FOR IP): Performed by: PSYCHIATRY & NEUROLOGY

## 2021-04-08 PROCEDURE — 6370000000 HC RX 637 (ALT 250 FOR IP): Performed by: PHYSICIAN ASSISTANT

## 2021-04-08 RX ORDER — AMLODIPINE BESYLATE 5 MG/1
5 TABLET ORAL DAILY
Status: DISCONTINUED | OUTPATIENT
Start: 2021-04-08 | End: 2021-04-11

## 2021-04-08 RX ORDER — LORAZEPAM 0.5 MG/1
0.5 TABLET ORAL 3 TIMES DAILY
Status: DISCONTINUED | OUTPATIENT
Start: 2021-04-08 | End: 2021-04-10

## 2021-04-08 RX ORDER — RISPERIDONE 1 MG/1
1 TABLET, FILM COATED ORAL 2 TIMES DAILY
Status: DISCONTINUED | OUTPATIENT
Start: 2021-04-08 | End: 2021-04-12 | Stop reason: HOSPADM

## 2021-04-08 RX ADMIN — LORAZEPAM 0.5 MG: 0.5 TABLET ORAL at 10:37

## 2021-04-08 RX ADMIN — METRONIDAZOLE 500 MG: 250 TABLET ORAL at 08:38

## 2021-04-08 RX ADMIN — AMLODIPINE BESYLATE 5 MG: 5 TABLET ORAL at 13:01

## 2021-04-08 RX ADMIN — METRONIDAZOLE 500 MG: 250 TABLET ORAL at 21:01

## 2021-04-08 RX ADMIN — RISPERIDONE 1 MG: 1 TABLET ORAL at 21:01

## 2021-04-08 RX ADMIN — LORAZEPAM 0.5 MG: 0.5 TABLET ORAL at 13:00

## 2021-04-08 RX ADMIN — LORAZEPAM 0.5 MG: 0.5 TABLET ORAL at 21:01

## 2021-04-08 RX ADMIN — RISPERIDONE 0.5 MG: 0.25 TABLET ORAL at 08:38

## 2021-04-08 NOTE — PROGRESS NOTES
Department of Psychiatry  Progress Note    Patient's chart was reviewed. Discussed with treatment team. Met with patient. SUBJECTIVE:      Trace improvement since admission. Still speaks very softy (if she speaks at all), exhibits long latencies, and seems paranoid. Taking risperidone and tolerating it well. ROS:   Patient has new complaints: no  Sleeping adequately:  Yes   Appetite adequate: Yes  Engaged in programming: No: limited    OBJECTIVE:  VITALS:  BP (!) 165/95   Pulse 112   Temp 98 °F (36.7 °C) (Temporal)   Resp 18   LMP  (LMP Unknown)   SpO2 98%   Breastfeeding No     Mental Status Examination:    Appearance: fair hygiene, slowed. Behavior/Attitude toward examiner:  poor eye contact  Speech:  poverty   Mood:  No response   Affect:  FLAT  Thought processes:  Blocked  Thought Content: no SI or HI voiced; +paranoid  Perceptions: ? RTIS  Attention: impaired   Abstraction: impaired  Cognition: unable to assess   Insight: impaired    Judgment: impaired     Medication:  Scheduled:   risperiDONE  0.5 mg Oral BID    metroNIDAZOLE  500 mg Oral 2 times per day        PRN:  acetaminophen, ibuprofen, magnesium hydroxide, traZODone, LORazepam **OR** LORazepam, OLANZapine **OR** OLANZapine, sterile water     Formulation: domiciled and never- 18yo without previous psychiatric history who was brought to [facility] ED by her mother with new-onset odd behavior. The patients presentation is consistent with catatonia. Differential includes schizophreniform disorder and major depressive disorder, recurrent, severe, with psychotic features.      Dx: Primary Psychiatric (DSM V) Diagnosis: schizophreniform disorder  Secondary Psychiatric (DSM V) Diagnoses: h/o depression  Chemical Dependency Diagnoses: none     Plan:     1. Admit to inpatient adult psychiatry for stabilization and treatment.     2. On admission, started Risperidone 0.5 mg BID. Gave 1mg ativan test dose for catatonia.  Ordered q15min checks for safety, programming, and prn medication for anxiety, agitation, and insomnia. 4/8/2021 - increase risperidone to 1mg BID. Add Ativan 0.5mg TID for catatonia.      3. Internal medicine consult for admission. Elevated Blood Pressure  - no prior diagnosis of HTN  - add PRN clonidine for SBP >160 and monitor BPs for 24 hours, if continues to remain elevated, will plan to initiate scheduled anti-hypertensive medication     Bacterial Vaginosis  - wet prep with 3+ clue cells  - start Flagyl 500 mg BID x 7 days     + Blood on UA  - pt is on her period     Ketonuria  - encourage PO fluid hydration     Morbid Obesity  - There is no height or weight on file to calculate BMI. - Complicating assessment and treatment. Placing patient at risk for multiple co-morbidities as well as early death and contributing to the patient's presentation.   - Counseled on weight loss.      4. Collateral information from family for diagnostic clarification and care coordination.     5. ELOS 8-10 days. Voluntary. Total time with patient was 35 minutes and more than 50 % of that time was spent counseling the patient on their symptoms, treatment, and expected goals.        Ericka Amaya MD

## 2021-04-08 NOTE — PLAN OF CARE
Patient was out with patient group, on the large side for wrap up group. Patient only said,\"No\", once when asked if she was suicidal, otherwise patient was non-verbal. Patient did color a picture, while sitting in the dayroom, on the smaller side. Patient was dozing off, while sitting in the dayroom & was directed to the bathroom & voided. Patient was directed with changing her clothes, then patient went to bed.  Annalise Willams R.N.

## 2021-04-08 NOTE — PLAN OF CARE
Problem: Altered Mood, Psychotic Behavior:  Goal: Able to demonstrate trust by eating, participating in treatment and following staff's direction  Description: Able to demonstrate trust by eating, participating in treatment and following staff's direction  4/8/2021 1108 by Desi Santana RN  Outcome: Ongoing   Patient has been awake and visible on small side of unit since start of shift. Pt continues to present with catatonic like symptoms. Pt slow to respond, but has been cooperative with staff. Pt started on scheduled ativan 0.5mg TID. Will continue to monitor for safety.

## 2021-04-08 NOTE — CARE COORDINATION
[employer]  Treatment Team Note  Day 1    Review Date & Time: 4/7/2021  0900    Patient was not in treatment team      Status EXAM:   Status and Exam  Normal: No  Facial Expression: Flat  Affect: Blunt  Level of Consciousness: Alert  Mood:Normal: No  Mood: Empty  Motor Activity:Normal: No  Motor Activity: Decreased  Interview Behavior: (Patient mostly, with no response to writer's questions.)  Preception: [place] to Person(CHAD- rest of orientation- pt non-verbal)  Attention:Normal: No  Attention: Distractible  Thought Processes: Blocking  Thought Content:Normal: No  Thought Content: Poverty of Content, Preoccupations  Hallucinations: Unable to assess(Patient declined to talk)  Delusions: (CHAD- patient declined to talk)  Memory:Normal: (CHAD- patient declined to talk)  Memory: Other(See comment)(CHAD- patient declined to talk)  Insight and Judgment: No  Insight and Judgment: Poor Judgment, Poor Insight  Present Suicidal Ideation: No  Present Homicidal Ideation: Other(See comment)(CHAD- patient declined to talk)      Suicide Risk CSSR-S:  1) Within the past month, have you wished you were dead or wished you could go to sleep and not wake up? : No  2) Have you actually had any thoughts of killing yourself? : No  6) Have you ever done anything, started to do anything, or prepared to do anything to end your life?: No      PLAN/TREATMENT RECOMMENDATIONS UPDATE: Patient will take medication as prescribed, eat 75% of meals, attend groups, participate in milieu activities, participate in treatment team and care planning for discharge and follow up.       Ryder Carson RN

## 2021-04-08 NOTE — PROGRESS NOTES
Occupational Therapy      Received OT orders. Hold OT eval for Psychiatrist note.     Sparkle Antonio, OTR/L  #[id number]

## 2021-04-08 NOTE — BH NOTE
Patient's mother, Sandra Stanton lynn with speaking with Dr. Khurram Melo tomorrow regarding patient's history and to obtain collateral.   [phone]

## 2021-04-08 NOTE — FLOWSHEET NOTE
Group Therapy Note    Date: 4/8/2021  Start Time: 1300  End Time:  1400  Number of Participants: 8    Type of Group: Music Group    Notes:  Pt present for Music Group. Pt participated by making a song selection and singing along to music. Participation Level:  Active Listener and Interactive    Participation Quality: Appropriate and Attentive      Speech:  normal      Affective Functioning: Blunted      Endings: None Reported    Modes of Intervention: Support, Socialization, Activity and Media      Discipline Responsible: Chaplain Cuca Cooper       04/08/21 1417   Encounter Summary   Services provided to: Patient   Continue Visiting   (4/8 Music Group)   Complexity of Encounter Moderate   Length of Encounter 1 hour

## 2021-04-09 PROCEDURE — 6370000000 HC RX 637 (ALT 250 FOR IP): Performed by: PHYSICIAN ASSISTANT

## 2021-04-09 PROCEDURE — 1240000000 HC EMOTIONAL WELLNESS R&B

## 2021-04-09 PROCEDURE — 90834 PSYTX W PT 45 MINUTES: CPT | Performed by: PSYCHIATRY & NEUROLOGY

## 2021-04-09 PROCEDURE — 6370000000 HC RX 637 (ALT 250 FOR IP): Performed by: PSYCHIATRY & NEUROLOGY

## 2021-04-09 RX ORDER — CITALOPRAM 20 MG/1
20 TABLET ORAL DAILY
Status: DISCONTINUED | OUTPATIENT
Start: 2021-04-09 | End: 2021-04-12 | Stop reason: HOSPADM

## 2021-04-09 RX ADMIN — LORAZEPAM 0.5 MG: 0.5 TABLET ORAL at 21:30

## 2021-04-09 RX ADMIN — LORAZEPAM 0.5 MG: 0.5 TABLET ORAL at 15:09

## 2021-04-09 RX ADMIN — CITALOPRAM HYDROBROMIDE 20 MG: 20 TABLET ORAL at 17:27

## 2021-04-09 RX ADMIN — LORAZEPAM 0.5 MG: 0.5 TABLET ORAL at 10:08

## 2021-04-09 RX ADMIN — AMLODIPINE BESYLATE 5 MG: 5 TABLET ORAL at 10:07

## 2021-04-09 RX ADMIN — METRONIDAZOLE 500 MG: 250 TABLET ORAL at 21:30

## 2021-04-09 RX ADMIN — RISPERIDONE 1 MG: 1 TABLET ORAL at 10:07

## 2021-04-09 RX ADMIN — RISPERIDONE 1 MG: 1 TABLET ORAL at 21:30

## 2021-04-09 RX ADMIN — METRONIDAZOLE 500 MG: 250 TABLET ORAL at 10:07

## 2021-04-09 ASSESSMENT — PAIN SCALES - GENERAL: PAINLEVEL_OUTOF10: 0

## 2021-04-09 NOTE — PROGRESS NOTES
Department of Psychiatry  Progress Note    Patient's chart was reviewed. Discussed with treatment team. Met with patient. SUBJECTIVE:      Has made solid gains on schedule ativan and risperidone; less paranoid, more communicative, improved affect, and is more active in the milieu. Met with her mother at length. She reports that her daughter's symptoms started about two weeks ago and culminated in:  Slurred speech  No interaction  Seeming distant  Mutism   Severe latencies   Reduced appetite  Standing in the same position for long periods of time. Responding to internal stimuli. Mom also reports her daughter was hospitalized years ago for depression and that schizophrenia runs in her family. Daughter was on Citalopram 40mg for a while but had not taken it in about a year. Discussed treatment plan - stop ativan, see how she does over the weekend on risperidone and back on citalopram only, and target DC Monday. Mom says her daughter is doing much better. ROS:   Patient has new complaints: no  Sleeping adequately:  Yes   Appetite adequate: Yes  Engaged in programming: improved    OBJECTIVE:  VITALS:  BP (!) 158/89   Pulse 86   Temp 97.5 °F (36.4 °C) (Oral)   Resp 18   Ht 5' 11\" (1.803 m)   Wt (!) 354 lb 0.9 oz (160.6 kg)   LMP  (LMP Unknown)   SpO2 98%   Breastfeeding No   BMI 49.38 kg/m²     Mental Status Examination:    Appearance: fair hygiene.     Behavior/Attitude toward examiner:  improved eye contact  Speech:  more communicative   Mood:  \"better\"  Affect:  more reactive  Thought processes:  goal directable    Thought Content: no SI or HI voiced; less paranoid  Perceptions: not RTIS  Attention: improving  Abstraction: improving  Cognition: intact  Insight: improving   Judgment: improving      Medication:  Scheduled:   LORazepam  0.5 mg Oral TID    risperiDONE  1 mg Oral BID    amLODIPine  5 mg Oral Daily    metroNIDAZOLE  500 mg Oral 2 times per day        PRN:  acetaminophen, ibuprofen, magnesium hydroxide, traZODone, LORazepam **OR** LORazepam, OLANZapine **OR** OLANZapine, sterile water     Formulation: domiciled and never- 20yo without previous psychiatric history who was brought to [facility] ED by her mother with new-onset odd behavior. The patients presentation is consistent with catatonia. Differential includes schizophreniform disorder and major depressive disorder, recurrent, severe, with psychotic features.      Dx: Primary Psychiatric (DSM V) Diagnosis: schizophreniform disorder  Secondary Psychiatric (DSM V) Diagnoses: h/o depression  Chemical Dependency Diagnoses: none     Plan:     1. Admit to inpatient adult psychiatry for stabilization and treatment.     2. On admission, started Risperidone 0.5 mg BID. Gave 1mg ativan test dose for catatonia. Ordered q15min checks for safety, programming, and prn medication for anxiety, agitation, and insomnia. 4/8/2021 - increase risperidone to 1mg BID. Add Ativan 0.5mg TID for catatonia. 4/9/2021 - stop ativan and resume citalopram see how she does over the weekend on risperidone and back on citalopram only, and target DC Monday. 3. Internal medicine consult for admission. Elevated Blood Pressure  - no prior diagnosis of HTN  - add Norvasc daily      Bacterial Vaginosis  - wet prep with 3+ clue cells  - start Flagyl 500 mg BID x 7 days     + Blood on UA  - pt is on her period     Ketonuria  - encourage PO fluid hydration     Morbid Obesity  - There is no height or weight on file to calculate BMI. - Complicating assessment and treatment. Placing patient at risk for multiple co-morbidities as well as early death and contributing to the patient's presentation.   - Counseled on weight loss.      4. Collateral information from family for diagnostic clarification and care coordination.     5. ELOS 8-10 days. Voluntary. see how she does over the weekend on risperidone and back on citalopram only, and target DC Monday.  Mom and patient in agreement. Total time with patient was 35 minutes and more than 50 % of that time was spent counseling the patient on their symptoms, treatment, and expected goals. Spent an additional 45min with the patient and her mother discussing diagnoses, treatment plan, and discharge planning.       Karyle Neighbours, MD

## 2021-04-09 NOTE — PLAN OF CARE
Problem: Falls - Risk of:  Goal: Will remain free from falls  Description: Will remain free from falls  Outcome: Ongoing     Problem: Altered Mood, Psychotic Behavior:  Goal: Able to demonstrate trust by eating, participating in treatment and following staff's direction  Description: Able to demonstrate trust by eating, participating in treatment and following staff's direction  Outcome: Ongoing     Problem: Altered Mood, Psychotic Behavior:  Goal: Able to verbalize decrease in frequency and intensity of hallucinations  Description: Able to verbalize decrease in frequency and intensity of hallucinations  Outcome: Ongoing     Problem: Altered Mood, Psychotic Behavior:  Goal: Able to verbalize reality based thinking  Description: Able to verbalize reality based thinking  Outcome: Ongoing   Pt has been out on the unit all evening. Pt has spent up til bedtime on the large side of the unit. She did play a game with another pt. She was pleasant with staff. Pt attended group and reportedly did fairly well. Pt still has a delay in answering questions, speaks very softly, but does answer and smiles. States part of her head feels \"funny. \" She does seem to endorse auditory hallucinations. Seems comfortable with staff. Stable on feet. Medication compliant. Cooperative.  Seems to be sleeping well at this time, (0130.)

## 2021-04-09 NOTE — GROUP NOTE
Group Therapy Note    Date: 4/8/2021    Group Start Time: 0830  Group End Time: 0900  Group Topic: Wrap-Up    [address]        Group Therapy Note    Attendees: 11         Patient's Goal: Pt to engage in group discussion. Talked about the rules of the unit and answered questions and concerns. Talked about goal of the day and what they did to achieve it. Notes: Pt was able to participate in group. Pt was able to engage and talk about his goal of \"interact with people\". Pt was able to work on staying out of her room and work with talking to others. Pt was able to engage in group activities. Status After Intervention:  Improved    Participation Level:  Active Listener    Participation Quality: Appropriate and Sharing      Speech:  normal      Thought Process/Content: Logical  Linear      Affective Functioning: Congruent      Mood: anxious      Level of consciousness:  Alert and Oriented x4      Response to Learning: Able to verbalize current knowledge/experience and Capable of insight      Endings: None Reported    Modes of Intervention: Education and Activity      Discipline Responsible: /Counselor      Signature:  Dalila Grace

## 2021-04-09 NOTE — GROUP NOTE
Group Therapy Note    Date: 4/9/2021    Group Start Time: 1115  Group End Time: 1200  Group Topic: Psychoeducation    [address] [place] [address]        Group Therapy Note    Attendees: 10    Patient's Goal: To read the article A Time to Be Kind by Lionel Cho and discuss gratitude, being in the here and now, how to increase happiness, the impact of positive thinking, and the positive impact of spending your free time engaging in meaningful leisure outlets. To practice gratitude by completing a gratitude journal prompt. Notes: Pt participated in Reading A Time to Be Kind, completed a gratitude journal prompt, and shared their journal entry with peers. Pt met group goal.    Status After Intervention:  Improved    Participation Level:  Active Listener and Interactive    Participation Quality: Appropriate, Attentive and Sharing      Speech:  normal      Thought Process/Content: Logical      Affective Functioning: Congruent      Mood: euthymic      Level of consciousness:  Alert, Oriented x4 and Attentive      Response to Learning: Able to verbalize current knowledge/experience, Able to verbalize/acknowledge new learning and Progressing to goal      Endings: None Reported    Modes of Intervention: Education, Support, Socialization, Exploration, Clarifying, Problem-solving and Activity      Discipline Responsible: Psychoeducational Specialist      Signature:  Brian Brasher, [address]

## 2021-04-09 NOTE — GROUP NOTE
Group Therapy Note    Date: 4/9/2021    Group Start Time: 1000  Group End Time: 3885  Group Topic: Psychoeducation    [address]        Group Therapy Note    Attendees: 8    Patient's Goal: to engage in Positive Definitions intervention by identifying positive words that describe songs utilized. Pts were then encouraged to define that word, identify other words that described someone with that quality, and share a time they embodied that word to encourage identification of positive behaviors and how they affect their emotions, increase communication skills, and increase socialization. Notes:  Emre Darnell actively engaged in group throughout. Pt shared, engaged in processing, and verbalized learning. Status After Intervention:  Improved    Participation Level:  Active Listener and Interactive    Participation Quality: Appropriate, Attentive, Sharing and Supportive      Speech:  Normal, quiet      Thought Process/Content: Logical  Linear      Affective Functioning: Constricted/Restricted      Mood: anxious      Level of consciousness:  Alert, Oriented x4 and Attentive      Response to Learning: Able to verbalize current knowledge/experience, Able to verbalize/acknowledge new learning, Capable of insight and Progressing to goal      Endings: None Reported    Modes of Intervention: Education, Support, Socialization, Exploration, Activity and Media      Discipline Responsible: Psychoeducational Specialist      Signature:  BOUBACAR LuxBC

## 2021-04-09 NOTE — PLAN OF CARE
Problem: Falls - Risk of:  Goal: Will remain free from falls  Description: Will remain free from falls  Outcome: Ongoing     Problem: Altered Mood, Psychotic Behavior:  Goal: Able to demonstrate trust by eating, participating in treatment and following staff's direction  Description: Able to demonstrate trust by eating, participating in treatment and following staff's direction  Outcome: Ongoing     Problem: Altered Mood, Psychotic Behavior:  Goal: Able to verbalize decrease in frequency and intensity of hallucinations  Description: Able to verbalize decrease in frequency and intensity of hallucinations  Outcome: Ongoing  Javier Oxford has been visible and more verbal this shift. Patient currently denies all and has been medication compliant. Patient has attended groups. Will continue to  monitor for safety.

## 2021-04-09 NOTE — GROUP NOTE
Group Therapy Note    Date: 4/9/2021    Group Start Time: 1310  Group End Time: 1400  Group Topic: Relaxation    [address] [facility]        Group Therapy Note    Attendees: 8    Patient's Goal: Pt's were encouraged to complete a guided meditation practice and to paint what they visualized during the guided meditation practice, to promote; relaxation, stress reduction, mindfulness, and mood improvement. Notes: Pt completed a guided meditation practice. Pt painted a picture of what she visualized during the guided meditation practice. Pt reported feeling more happy and relaxed, at the conclusion of group intervention. Status After Intervention:  Improved    Participation Level:  Active Listener and Interactive    Participation Quality: Appropriate and Attentive      Speech:  normal      Thought Process/Content: Logical      Affective Functioning: Congruent      Mood: euthymic      Level of consciousness:  Alert, Oriented x4 and Attentive      Response to Learning: Able to verbalize current knowledge/experience, Able to verbalize/acknowledge new learning and Progressing to goal      Endings: None Reported    Modes of Intervention: Education, Support, Socialization, Exploration and Activity      Discipline Responsible: Psychoeducational Specialist      Signature:  Michael Garnett

## 2021-04-10 PROCEDURE — 1240000000 HC EMOTIONAL WELLNESS R&B

## 2021-04-10 PROCEDURE — 6370000000 HC RX 637 (ALT 250 FOR IP): Performed by: PHYSICIAN ASSISTANT

## 2021-04-10 PROCEDURE — 6370000000 HC RX 637 (ALT 250 FOR IP): Performed by: PSYCHIATRY & NEUROLOGY

## 2021-04-10 RX ORDER — LORAZEPAM 1 MG/1
1 TABLET ORAL 3 TIMES DAILY
Status: DISCONTINUED | OUTPATIENT
Start: 2021-04-10 | End: 2021-04-12 | Stop reason: HOSPADM

## 2021-04-10 RX ORDER — OLANZAPINE 10 MG/1
10 TABLET ORAL DAILY PRN
Status: DISCONTINUED | OUTPATIENT
Start: 2021-04-10 | End: 2021-04-12 | Stop reason: HOSPADM

## 2021-04-10 RX ORDER — LORAZEPAM 1 MG/1
1 TABLET ORAL DAILY PRN
Status: DISCONTINUED | OUTPATIENT
Start: 2021-04-10 | End: 2021-04-10

## 2021-04-10 RX ORDER — ACETAMINOPHEN 325 MG/1
650 TABLET ORAL EVERY 4 HOURS PRN
Status: DISCONTINUED | OUTPATIENT
Start: 2021-04-10 | End: 2021-04-12 | Stop reason: HOSPADM

## 2021-04-10 RX ADMIN — METRONIDAZOLE 500 MG: 250 TABLET ORAL at 20:41

## 2021-04-10 RX ADMIN — LORAZEPAM 1 MG: 1 TABLET ORAL at 14:04

## 2021-04-10 RX ADMIN — RISPERIDONE 1 MG: 1 TABLET ORAL at 20:41

## 2021-04-10 RX ADMIN — TRAZODONE HYDROCHLORIDE 50 MG: 50 TABLET ORAL at 00:32

## 2021-04-10 RX ADMIN — RISPERIDONE 1 MG: 1 TABLET ORAL at 09:24

## 2021-04-10 RX ADMIN — METRONIDAZOLE 500 MG: 250 TABLET ORAL at 09:24

## 2021-04-10 RX ADMIN — AMLODIPINE BESYLATE 5 MG: 5 TABLET ORAL at 09:24

## 2021-04-10 RX ADMIN — LORAZEPAM 1 MG: 1 TABLET ORAL at 20:41

## 2021-04-10 RX ADMIN — CITALOPRAM HYDROBROMIDE 20 MG: 20 TABLET ORAL at 09:24

## 2021-04-10 NOTE — BH NOTE
Pt showing increased negative symptoms throughout morning and afternoon aeb marked latency . Pt given 1mg ativan per mar. Medication was effective, approximately one hour after administration pt was speech was more clear and considerably less delay. Provider notified, medication changes ordered per [facility]

## 2021-04-10 NOTE — GROUP NOTE
Group Therapy Note    Date: 4/10/2021    Group Start Time: 2000  Group End Time: 2100  Group Topic: Relapse Prevention    Kaylin Hagen, RN        Group Therapy Note    Attendees: AA/NA group and then wrap up         Patient's Goal:  To attend group    Notes:  Patient calm and cooperative on unit    Status After Intervention:  Improved    Participation Level:  Active Listener and Interactive    Participation Quality: Appropriate, Attentive and Sharing      Speech:  normal      Thought Process/Content: Logical  Linear      Affective Functioning: Congruent      Mood: anxious and depressed      Level of consciousness:  Alert and Oriented x4      Response to Learning: Able to verbalize current knowledge/experience, Able to verbalize/acknowledge new learning and Able to retain information      Endings: None Reported    Modes of Intervention: Education, Support and Socialization      Discipline Responsible: Registered Nurse      Signature:  Tamar Deng RN

## 2021-04-10 NOTE — PLAN OF CARE
Problem: Falls - Risk of:  Goal: Will remain free from falls  Description: Will remain free from falls  Outcome: Met This Shift     Problem: Falls - Risk of:  Goal: Absence of physical injury  Description: Absence of physical injury  Outcome: Met This Shift     Problem: Altered Mood, Psychotic Behavior:  Goal: Able to verbalize reality based thinking  Description: Able to verbalize reality based thinking  4/10/2021 1006 by Marcial Burdick RN  Outcome: Met This Shift     Problem: Altered Mood, Psychotic Behavior:  Goal: Ability to interact with others will improve  Description: Ability to interact with others will improve  4/10/2021 1006 by Marcial Burdick RN  Outcome: Met This Shift

## 2021-04-10 NOTE — FLOWSHEET NOTE
04/10/21 1000   Status and Exam   Normal No   Facial Expression Worried   Affect Blunt   Level of Consciousness Alert   Mood:Normal No   Mood Depressed; Anxious   Motor Activity:Normal Yes   Interview Behavior Cooperative   Preception [place] to Person;[place] to Time;[place] to Place;[place] to Situation   Attention:Normal No   Attention Distractible   Thought Processes Blocking   Thought Content:Normal No   Thought Content Poverty of Content   Hallucinations Other (Comment)  (denies, appears distracted at times)   Delusions No   Memory:Normal No   Memory Poor Recent   Insight and Judgment No   Insight and Judgment Poor Insight;Poor Judgment   Present Suicidal Ideation No   Present Homicidal Ideation No

## 2021-04-10 NOTE — GROUP NOTE
Group Therapy Note    Date: 4/10/2021    Group Start Time: 1030  Group End Time: 6990  Group Topic: Art Therapy     [address]        Group Therapy Note    Attendees: 10         Patient's Goal:  Patients were invited to participate in a guided visualization exercise using all five of their senses to imagine themselves in a safe, peaceful place. Patients were encouraged to share their experience with the guided visualization and then to use an art material of their choosing to represent themselves in their safe place. Lastly, patients were asked to process their art with the group. Notes:  Cheri Rucker appeared to engage in the guided visualization exercise, participated in art making, and shared her artwork of an \"is[place] in the ocean\" with the group. Status After Intervention:  Improved    Participation Level:  Active Listener and Interactive    Participation Quality: Appropriate, Attentive and Sharing      Speech:  hesitant      Thought Process/Content: Linear      Affective Functioning: Blunted      Mood: depressed      Level of consciousness:  Alert, Oriented x4 and Attentive      Response to Learning: Able to verbalize current knowledge/experience, Able to verbalize/acknowledge new learning, Able to retain information and Capable of insight      Endings: None Reported    Modes of Intervention: Education, Support, Socialization, Exploration, Clarifying, Problem-solving, Activity and Media      Discipline Responsible: Psychoeducational Specialist      Signature:  Dimitry Rosas

## 2021-04-10 NOTE — PLAN OF CARE
Problem: Altered Mood, Psychotic Behavior:  Goal: Compliance with prescribed medication regimen will improve  Description: Compliance with prescribed medication regimen will improve  Outcome: Ongoing     Problem: Altered Mood, Psychotic Behavior:  Goal: Able to demonstrate trust by eating, participating in treatment and following staff's direction  Description: Able to demonstrate trust by eating, participating in treatment and following staff's direction  4/10/2021 0226 by Kennedi Neves RN  Outcome: Ongoing     Problem: Altered Mood, Psychotic Behavior:  Goal: Able to verbalize reality based thinking  Description: Able to verbalize reality based thinking  Outcome: Ongoing     Problem: Altered Mood, Psychotic Behavior:  Goal: Ability to interact with others will improve  Description: Ability to interact with others will improve  Outcome: Ongoing     Problem: Altered Mood, Psychotic Behavior:  Goal: Compliance with prescribed medication regimen will improve  Description: Compliance with prescribed medication regimen will improve  Outcome: Ongoing   Pt was less than moderately verbal with this nurse early in the evening. She didn't think she was hearing voice. She denied the strange feeling in the left side of her head she spoke of last evening. She denied SI. Pt takes meds without difficulty. She was sw verbal during the evening, and sat and talked with another pt on the large side. However, how verbal she was in that conversation is not known. Pt did go on the bed and fell asleep around 2300. Pt awakened at about 0030. This nurse was making rounds and the pt was awake. She had great difficulty answer questions, voice was barely able to be heard. Asked if she'd take Trazodone to help her rest and she barely shook her head in the positive. Trazodone given at 0032. Pt did not seem to have difficulty with motor skills or swallowing. She return to sleep in about 45 min.  Pt appeared frightened but couldn't seem to articulate this. Some paranoia. This nurse came in to sit with pt and she was asleep shortly. Pt seemed to sleep the majority of the rest of the night. Trazodone effective.

## 2021-04-11 PROCEDURE — 6370000000 HC RX 637 (ALT 250 FOR IP): Performed by: PHYSICIAN ASSISTANT

## 2021-04-11 PROCEDURE — 6370000000 HC RX 637 (ALT 250 FOR IP): Performed by: PSYCHIATRY & NEUROLOGY

## 2021-04-11 PROCEDURE — 1240000000 HC EMOTIONAL WELLNESS R&B

## 2021-04-11 PROCEDURE — 99233 SBSQ HOSP IP/OBS HIGH 50: CPT | Performed by: PSYCHIATRY & NEUROLOGY

## 2021-04-11 RX ORDER — AMLODIPINE BESYLATE 5 MG/1
5 TABLET ORAL DAILY
Status: DISCONTINUED | OUTPATIENT
Start: 2021-04-11 | End: 2021-04-12 | Stop reason: HOSPADM

## 2021-04-11 RX ORDER — LISINOPRIL AND HYDROCHLOROTHIAZIDE 12.5; 1 MG/1; MG/1
1 TABLET ORAL DAILY
Status: DISCONTINUED | OUTPATIENT
Start: 2021-04-11 | End: 2021-04-12 | Stop reason: HOSPADM

## 2021-04-11 RX ORDER — AMLODIPINE BESYLATE 5 MG/1
10 TABLET ORAL DAILY
Status: DISCONTINUED | OUTPATIENT
Start: 2021-04-11 | End: 2021-04-11

## 2021-04-11 RX ADMIN — LORAZEPAM 1 MG: 1 TABLET ORAL at 08:03

## 2021-04-11 RX ADMIN — AMLODIPINE BESYLATE 5 MG: 5 TABLET ORAL at 08:06

## 2021-04-11 RX ADMIN — RISPERIDONE 1 MG: 1 TABLET ORAL at 20:44

## 2021-04-11 RX ADMIN — METRONIDAZOLE 500 MG: 250 TABLET ORAL at 08:03

## 2021-04-11 RX ADMIN — CITALOPRAM HYDROBROMIDE 20 MG: 20 TABLET ORAL at 08:03

## 2021-04-11 RX ADMIN — LORAZEPAM 1 MG: 1 TABLET ORAL at 20:43

## 2021-04-11 RX ADMIN — RISPERIDONE 1 MG: 1 TABLET ORAL at 08:03

## 2021-04-11 RX ADMIN — METRONIDAZOLE 500 MG: 250 TABLET ORAL at 20:44

## 2021-04-11 RX ADMIN — LORAZEPAM 1 MG: 1 TABLET ORAL at 14:11

## 2021-04-11 RX ADMIN — LISINOPRIL AND HYDROCHLOROTHIAZIDE 1 TABLET: 12.5; 1 TABLET ORAL at 10:38

## 2021-04-11 NOTE — GROUP NOTE
Group Therapy Note    Date: 4/11/2021    Group Start Time: 1030  Group End Time: 7872  Group Topic: Healthy Living/Wellness    [address] [facility]        Group Therapy Note    Attendees: 13         Patient's Goal:  Patient was invited to engage in a Healthy Living / Wellness group utilizing group games for strengthening interpersonal skills, group socialization, and communication. Patient was asked to engage in a warm up exercise with a large, conversation starter beach ball, then to play the game \"Who Am I? \" asking peers to identify the name of the famous person stuck on a post it note on patient's forehead, followed by charades, and then Pictionary. At the end of the games, patient was asked to process the experience and to note any shifts or changes in mood or energy. Notes:  Israel Hernandez participated in the group warm up, actively engaged in \"Who Am I?,\" charades, and pictionary, and reported an improved mood at the end of group. Status After Intervention:  Improved    Participation Level:  Active Listener and Interactive    Participation Quality: Appropriate, Attentive, Sharing and Supportive      Speech:  hesitant      Thought Process/Content: Logical      Affective Functioning: Blunted      Mood: anxious      Level of consciousness:  Alert, Oriented x3 and Attentive    Response to Learning: Able to verbalize current knowledge/experience, Able to verbalize/acknowledge new learning, Able to retain information and Capable of insight      Endings: None Reported    Modes of Intervention: Education, Support, Socialization, Exploration, Clarifying, Problem-solving, Activity and Media      Discipline Responsible: Psychoeducational Specialist      Signature:  Ashley Manley [facility]

## 2021-04-11 NOTE — PROGRESS NOTES
Writer returned a call to patient's Mother, Stephanie Olivares at 21:00. Shaka verbalized concern, that patient was back on ativan. Marci Fall, told me that she was given ativan today & I spoke to Dr. Akila Valdivia yesterday & told me that he was stopping the ativan & that she would probably be discharges Monday. I don't want her being put back on the ativan to cause her not to be discharged Monday & I don't want her addicted to it. Writer notified Dr Kimmie Hargrove at, 21:10, with Shaka's concerns & read to her Dr. Jessica Negrete note related to the ativan, with no orders received. Dr. Kimmie Hargrove reported that patient was mostly mute before receiving the ativan & then was talking after the ativan. Dr. Kimmie Hargrove stated to inform Stephanie Marshall that patient may need a slow taper of the ativan & could still be discharged, while still being prescribed the ativan. Stephanie Olivares was notified of this at 21:25 & thanked writer, for clarifying things.  Toyin Willams R.N.

## 2021-04-11 NOTE — GROUP NOTE
Group Therapy Note    Date: 4/11/2021    Group Start Time: 1300  Group End Time: 1400  Group Topic: Psychoeducation    [address]        Group Therapy Note    Attendees: 9         Patient's Goal: to learn the communication styles of being passive, assertive and aggressive and that communicating in an assertive manner is the healthiest way to communicate. Pt's were asked to apply to themselves. Notes:  Pt participated in group discussion and was able to apply to herself. Status After Intervention:  Improved    Participation Level:  Active Listener and Interactive    Participation Quality: Appropriate, Attentive and Sharing      Speech:  hesitant      Thought Process/Content: Linear      Affective Functioning: Congruent      Mood: anxious and depressed      Level of consciousness:  Alert, Oriented x4 and Attentive      Response to Learning: Able to verbalize current knowledge/experience, Able to verbalize/acknowledge new learning and Progressing to goal      Endings: None Reported    Modes of Intervention: Education, Support, Socialization, Exploration and Clarifying      Discipline Responsible: /Counselor      Signature:  ANDERS Dwyer

## 2021-04-11 NOTE — BH NOTE
Group Therapy Note    Date: 4/10/2021  Start Time: 20:00  End Time:  21:00  Number of Participants: 12    Type of Group: Recreational  Wrap up    Wellness Binder Information  Module Name:  /  Session Number:  /    Patient's Goal:  Be out of room more    Notes:  Continuing to work on goal    Status After Intervention:  Unchanged    Participation Level:  Active Listener and Interactive    Participation Quality: Appropriate, Attentive and Resistant      Speech:  hesitant      Thought Process/Content: Perseverating      Affective Functioning: Blunted      Mood: anxious      Level of consciousness:  Alert and Attentive      Response to Learning: Able to change behavior      Endings: None Reported    Modes of Intervention: Socialization and Problem-solving      Discipline Responsible: Behavorial Health Tech      Signature:  Maribell Carcamo

## 2021-04-11 NOTE — BH NOTE
Pt affect significantly brighter this day as compared to previous day, smiling, laughing appropriately, and more social with staff and peers. Pt speech, while soft spoken, is fluid and clear with timely responses. Pt appears happy and at ease. Pt believes, and writer concurs, that her current medication regimen appears to be effective. Pt, \"feels like a new woman. \"

## 2021-04-11 NOTE — PROGRESS NOTES
Department of Psychiatry  Progress Note    Patient's chart was reviewed. Discussed with treatment team. Met with patient. SUBJECTIVE:      Pt yesterday was having difficulties she appeared to be thought blocking and not wanting to engage. She was isolated. And we did trial of ativan before we schedule ativan. She responded and she started engaging and able to carry on spontaneous conversations with the ativan and it was schedule at this time. We did spoke with mom and she was concern that this will affect her discharge Monday. I explained that outpt provider will need to do a very gentle taper to make sure sx's did not come back. Possible discharge Monday. Pt reports feeling better and denies sx's of psychosis and ambivalence at this time. ROS:   Patient has new complaints: no  Sleeping adequately:  Yes   Appetite adequate: Yes  Engaged in programming: improved    OBJECTIVE:  VITALS:  BP (!) 170/82   Pulse 102   Temp 98 °F (36.7 °C) (Temporal)   Resp 16   Ht 5' 11\" (1.803 m)   Wt (!) 354 lb 0.9 oz (160.6 kg)   LMP  (LMP Unknown)   SpO2 99%   Breastfeeding No   BMI 49.38 kg/m²     Mental Status Examination:    Appearance: fair hygiene. Behavior/Attitude toward examiner:  improved eye contact  Speech:  more communicative   Mood:  \"better\"  Affect:  more reactive  Thought processes:  goal directable    Thought Content: no SI or HI voiced; not paranoid  Perceptions: not RTIS  Attention: improving  Abstraction: improving  Cognition: intact  Insight: improving   Judgment: improving      Medication:  Scheduled:   lisinopril-hydroCHLOROthiazide  1 tablet Oral Daily    amLODIPine  5 mg Oral Daily    LORazepam  1 mg Oral TID    citalopram  20 mg Oral Daily    risperiDONE  1 mg Oral BID    metroNIDAZOLE  500 mg Oral 2 times per day        PRN:  OLANZapine **OR** [DISCONTINUED] OLANZapine, acetaminophen, magnesium hydroxide, traZODone          Dx:    Primary Psychiatric (DSM V) Diagnosis: schizophreniform disorder  Secondary Psychiatric (DSM V) Diagnoses: h/o depression  Chemical Dependency Diagnoses: none     Plan:     1. Admit to inpatient adult psychiatry for stabilization and treatment.     2. On admission, started Risperidone 0.5 mg BID. Gave 1mg ativan test dose for catatonia. Ordered q15min checks for safety, programming, and prn medication for anxiety, agitation, and insomnia. 4/8/2021 - increase risperidone to 1mg BID. Add Ativan 0.5mg TID for catatonia. 4/9/2021 - stop ativan and resume citalopram see how she does over the weekend on risperidone and back on citalopram only, and target DC Monday. 4/10/21- pt was restarted on ativan since she has started thought blocking and appearing somewhat catatonic once it was d/c.    3. Internal medicine consult for admission.       4. Collateral information from family for diagnostic clarification and care coordination.     5. Per primary team Voluntary. target DC Monday per primary team. Mom and patient in agreement. Total time with patient was40 minutes and more than 50 % of that time was spent counseling the patient on their symptoms, treatment, and expected goals.

## 2021-04-12 VITALS
HEART RATE: 101 BPM | SYSTOLIC BLOOD PRESSURE: 136 MMHG | TEMPERATURE: 98.6 F | HEIGHT: 71 IN | WEIGHT: 293 LBS | BODY MASS INDEX: 41.02 KG/M2 | DIASTOLIC BLOOD PRESSURE: 60 MMHG | OXYGEN SATURATION: 97 % | RESPIRATION RATE: 16 BRPM

## 2021-04-12 PROCEDURE — 6370000000 HC RX 637 (ALT 250 FOR IP): Performed by: PHYSICIAN ASSISTANT

## 2021-04-12 PROCEDURE — 6370000000 HC RX 637 (ALT 250 FOR IP): Performed by: PSYCHIATRY & NEUROLOGY

## 2021-04-12 PROCEDURE — 99239 HOSP IP/OBS DSCHRG MGMT >30: CPT | Performed by: PSYCHIATRY & NEUROLOGY

## 2021-04-12 PROCEDURE — 5130000000 HC BRIDGE APPOINTMENT

## 2021-04-12 RX ORDER — METRONIDAZOLE 500 MG/1
500 TABLET ORAL EVERY 12 HOURS SCHEDULED
Qty: 10 TABLET | Refills: 0 | Status: SHIPPED | OUTPATIENT
Start: 2021-04-12 | End: 2021-04-17

## 2021-04-12 RX ORDER — AMLODIPINE BESYLATE 5 MG/1
5 TABLET ORAL DAILY
Qty: 30 TABLET | Refills: 0 | Status: SHIPPED | OUTPATIENT
Start: 2021-04-12

## 2021-04-12 RX ORDER — CITALOPRAM 20 MG/1
20 TABLET ORAL DAILY
Qty: 30 TABLET | Refills: 0 | Status: SHIPPED | OUTPATIENT
Start: 2021-04-12

## 2021-04-12 RX ORDER — LORAZEPAM 1 MG/1
1 TABLET ORAL 3 TIMES DAILY
Qty: 42 TABLET | Refills: 0 | Status: SHIPPED | OUTPATIENT
Start: 2021-04-12 | End: 2021-04-26

## 2021-04-12 RX ORDER — LISINOPRIL AND HYDROCHLOROTHIAZIDE 12.5; 1 MG/1; MG/1
1 TABLET ORAL DAILY
Qty: 30 TABLET | Refills: 0 | Status: SHIPPED | OUTPATIENT
Start: 2021-04-12

## 2021-04-12 RX ORDER — RISPERIDONE 1 MG/1
1 TABLET, FILM COATED ORAL 2 TIMES DAILY
Qty: 60 TABLET | Refills: 0 | Status: SHIPPED | OUTPATIENT
Start: 2021-04-12

## 2021-04-12 RX ADMIN — LISINOPRIL AND HYDROCHLOROTHIAZIDE 1 TABLET: 12.5; 1 TABLET ORAL at 09:56

## 2021-04-12 RX ADMIN — METRONIDAZOLE 500 MG: 250 TABLET ORAL at 09:56

## 2021-04-12 RX ADMIN — LORAZEPAM 1 MG: 1 TABLET ORAL at 09:56

## 2021-04-12 RX ADMIN — RISPERIDONE 1 MG: 1 TABLET ORAL at 09:56

## 2021-04-12 RX ADMIN — AMLODIPINE BESYLATE 5 MG: 5 TABLET ORAL at 09:56

## 2021-04-12 RX ADMIN — CITALOPRAM HYDROBROMIDE 20 MG: 20 TABLET ORAL at 09:56

## 2021-04-12 NOTE — GROUP NOTE
Group Therapy Note    Date: 4/12/2021    Group Start Time: 1000  Group End Time: 1050  Group Topic: Recreational    [address] [place] [address]        Group Therapy Note    Attendees: 10    Patient's Goal: to participate in four leisure stations; to promote awareness of leisure activities, enjoyment of engaging in various leisure activities, teamwork, laughter, socialization, cognitive stimulation, stress reduction, and mood improvement. To create a list of preferred leisure outlets and identify and discuss the benefits of engaging in preferred leisure outlets daily. Notes: Pt was excused from approximately 20 minutes of group, due to meeting with MD. Varsha Lozano actively participated in two different leisure stations. Pt demonstrated positive teamwork skills, problem solving skills, social skills, and cognitive skills. Pt created a list of preferred leisure outlets. Pt identified the positive benefits of engaging in preferred leisure outlets daily. Pt brightened appropriately throughout group. Pt met group goal.     Status After Intervention:  Improved    Participation Level:  Active Listener and Interactive    Participation Quality: Appropriate, Attentive and Sharing      Speech:  normal      Thought Process/Content: Logical      Affective Functioning: Congruent      Mood: euthymic      Level of consciousness:  Alert, Oriented x4 and Attentive      Response to Learning: Able to verbalize current knowledge/experience, Able to verbalize/acknowledge new learning and Progressing to goal      Endings: None Reported    Modes of Intervention: Education, Support, Socialization, Exploration, Clarifying, Problem-solving and Activity      Discipline Responsible: Psychoeducational Specialist      Signature:  Breana Noguera [place]

## 2021-04-12 NOTE — BH NOTE
[facility]  Discharge Note    Pt discharged with followings belongings:   Dentures: None  Vision - Corrective Lenses: Glasses  Hearing Aid: None  Jewelry: None  Clothing: Socks, Undergarments (Comment), Footwear  Were All Patient Medications Collected?: Not Applicable(celexa sent home with pt mother)  Other Valuables: None   Valuables sent home with patient. Valuables retrieved from safe and returned to patient. Patient education on aftercare instructions: yes. Information faxed to [facility] by marcos Mccullough. Patient verbalize understanding of AVS:  yes. Status EXAM upon discharge:  Status and Exam  Normal: No  Facial Expression: Brightened  Affect: Constricted  Level of Consciousness: Alert  Mood:Normal: Yes  Mood: Depressed(improved depression)  Motor Activity:Normal: Yes  Motor Activity: Decreased  Interview Behavior: Cooperative  Preception: [place] to Person, [place]ena Lena to Time, [place] to Place, [place] to Situation  Attention:Normal: Yes  Attention: Distractible  Thought Processes: Blocking(At intervals, but patient also initiates conversation)  Thought Content:Normal: Yes  Thought Content: Poverty of Content(Improving)  Hallucinations: None  Delusions: No  Memory:Normal: No  Memory: Poor Recent  Insight and Judgment: No  Insight and Judgment: Poor Insight(Improving)  Present Suicidal Ideation: No  Present Homicidal Ideation: No      Metabolic Screening:    Lab Results   Component Value Date    LABA1C 5.6 04/07/2021       Lab Results   Component Value Date    CHOL 205 (H) 04/07/2021     Lab Results   Component Value Date    TRIG 72 04/07/2021     Lab Results   Component Value Date    HDL 37 (L) 04/07/2021     No components found for: [facility] TREAT[facility]  Lab Results   Component Value Date    LABVLDL 14 04/07/2021       Rey Cruz RN    Bridge Appointment completed: Reviewed Discharge Instructions with patient.     Patient verbalizes understanding and agreement with the discharge plan

## 2021-04-12 NOTE — GROUP NOTE
Group Therapy Note    Date: 4/12/2021    Group Start Time: 1105  Group End Time: [address]        Group Therapy Note    Attendees: 9    Patient's Goal: to engage in identifying negative self-talk in the song Don't Let Me Get Me by P!nk and work with peers to reframe each identified instance. Pts were then encouraged to identify negative self-talk they use and work on reframing with the aid a reframing worksheet to practice identifying and reframing negative self-talk, discuss impact of negative self-talk on mental health, and improve communication skills. Notes:  Gregorio Grossman actively engaged in group throughout. Pt engaged in processing discussion and verbalized learning. Status After Intervention:  Improved    Participation Level:  Active Listener and Interactive    Participation Quality: Appropriate, Attentive, Sharing and Supportive      Speech:  normal      Thought Process/Content: Logical  Linear      Affective Functioning: Constricted/Restricted      Mood: anxious      Level of consciousness:  Alert, Oriented x4 and Attentive      Response to Learning: Able to verbalize current knowledge/experience, Able to verbalize/acknowledge new learning, Capable of insight and Progressing to goal      Endings: None Reported    Modes of Intervention: Education, Support, Socialization, Clarifying, Problem-solving, Activity and Media      Discipline Responsible: Psychoeducational Specialist      Signature:  ANT Rajan

## 2021-04-12 NOTE — SUICIDE SAFETY PLAN
SAFETY PLAN    A suicide Safety Plan is a document that supports someone when they are having thoughts of suicide. Warning Signs that indicate a suicidal crisis may be developing: What (situations, thoughts, feelings, body sensations, behaviors, etc.) do you experience that lets you know you are beginning to think about suicide? 1. Start breathing faster      Internal Coping Strategies:  What things can I do (relaxation techniques, hobbies, physical activities, etc.) to take my mind off my problems without contacting another person? 1. Play music  2. Work out  3. Walk dog    People and social settings that provide distraction: Who can I call or where can I go to distract me? 1. Place: cookouts            2. Place: crowded places    People whom I can ask for help: Who can I call when I need help - for example, friends, family, clergy, someone else? 1. Name: Mom                        Phone: [phone]  2. Name: Sister  Phone: [phone]  3. Name: grandmother Phone: [phone]    Professionals or [address] I can contact during a crisis: Who can I call for help - for example, my doctor, my psychiatrist, my psychologist, a mental health provider, a suicide hotline? 1. Suicide Prevention Lifeline: 9-943-825-TALK (8210)    Making the environment safe: How can I make my environment (house/apartment/living space) safer? For example, can I remove guns, medications, and other items? 1. Call mom  2.  Put on favorite music

## 2021-04-12 NOTE — BH NOTE
Group Therapy Note    Date: 4/11/2021  Start Time: 20:00  End Time:  21:00  Number of Participants: 12    Type of Group: Recreational  Wrap up    Karsten Torres Information  Module Name:  /  Session Number:  /    Patient's Goal:  Out of room more    Notes:  Continuing to work on goal    Status After Intervention:  Unchanged    Participation Level:  Active Listener and Interactive    Participation Quality: Appropriate, Attentive and Sharing      Speech:  pressured      Thought Process/Content: Logical      Affective Functioning: Blunted      Mood: anxious      Level of consciousness:  Alert and Attentive      Response to Learning: Able to change behavior      Endings: None Reported    Modes of Intervention: Socialization and Problem-solving      Discipline Responsible: [address] [employer]      Signature:  Favian Howard

## 2021-04-12 NOTE — PLAN OF CARE
Patient was out with patient group, early in the shift & was social. Patient was observed to be laughing & smiling with peers. Patient answered questions in 1:1, with minimal hesitation. Patient reported that she hopes for discharge Monday & that her mom told her, that they will be seeing a movie & she is looking forward to it.  Sammi Willams R.N.

## 2021-04-13 NOTE — DISCHARGE SUMMARY
Discharge Summary   Admit Date: 4/6/2021   Discharge Date:  4/12/2021    Condition at DC stable  Spent over 40 minutes with patient and staff on [address] [facility] [address] with more than 50 % of time spent with patient discussing care  Final Dx: axis I: Schizophreniform disorder ([address]   Axis 2: No diagnosis  Bobbi 3: See Medical History    And Present on Admission:   Schizophreniform disorder ([address]   Elevated BP without diagnosis of hypertension   Bacterial vaginosis   Ketonuria   Morbid obesity ([address]     Axis 4: Problems related to the social environment  Axis 5:  On Admission: 31-40 impairment in reality testing At Discharge: 51-60 moderate symptoms   All conditions on Axis 1 and Axis 2 and active problems on Axis 3 were treated while patient was hospitalized.  [facility] Problems    Diagnosis Date Noted    Elevated BP without diagnosis of hypertension [R03.0]     Bacterial vaginosis [N76.0, B96.89]     Ketonuria [R82.4]     Morbid obesity (HCC) [E66.01]     Schizophreniform disorder ([facility] [F20.81] 04/05/2021   )   Condition on DC  Mood and affect are stable and pt is not suicidal   VITALS:  /60   Pulse 101   Temp 98.6 °F (37 °C) (Temporal)   Resp 16   Ht 5' 11\" (1.803 m)   Wt (!) 354 lb 0.9 oz (160.6 kg)   LMP  (LMP Unknown)   SpO2 97%   Breastfeeding No   BMI 49.38 kg/m²   Brief Summary Present Illness   CC: patient not verbal      HPI:   Per the ED physician's note:  Julita Arguello a 24 y.o. female who presents to the emergency department with her mother stating that she has been acting normal for the last 2 weeks.  She states she seems withdrawn. Aaliyah Herrera is frequently found staring. Aaliyah Herrera has noticed that some nights she does not seem to be sleeping.  Is not been speaking as much as usual.  Her mother feels that on Friday she noticed her slurring her speech and she has been slurring it since then. Aaliyah Herrera took her to [address] [facility] mother states that they were there for so long that she decided to leave and bring her here. Leland Caldwell mother states she has been eating, but not as much as usual. Joe Delaney is not aware of any drug use.  She has a history of depression and is on Celexa.  States her sister was ready to leave for school and saw her standing at a window staring.  She came home a couple hours later after taking a test at school and she was still standing in the same spot staring out the window.  When she arrived here the patient would not talk to me at all. [facility] would shake her head yes and no.  She would cooperate for exam and lift and move her arms as I indicated, but I cannot get her to verbalize at all.     When I met with the patient today, she was unable to provide a history. She seemed to struggle to speak and, when she did, it was barely audible. She was able to answer some questions with nods but that tappered off too.     She made little eye contact, had long latencies, and seemed blocked.     She nodded yes when asked if she is fearful. She nodded yes when asked if she is willing to try [address]  Patient stabilized on meds and milieu treatment. start Risperidone 0.5 mg BID. Give 1mg ativan test dose for catatonia. Order q15min checks for safety, programming, and prn medication for anxiety, agitation, and insomnia.    gains on schedule ativan and risperidone; less paranoid, more communicative, improved affect, and is more active in the milieu.    per Dr. Nato Montague:    Met with her mother at length. She reports that her daughter's symptoms started about two weeks ago and culminated in:  Slurred speech  No interaction  Seeming distant  Mutism   Severe latencies   Reduced appetite  Standing in the same position for long periods of time. Responding to internal stimuli.     Mom also reports her daughter was hospitalized years ago for depression and that schizophrenia runs in her family. Daughter was on Citalopram 40mg for a while but had not taken it in about a year.    Discussed treatment plan - stop ativan, see how she does over the weekend on risperidone and back on citalopram only, and target DC Monday. Per Dr. Miriam Loera  having difficulties she appeared to be thought blocking and not wanting to engage. She was isolated. And we did trial of ativan before we schedule ativan. She responded and she started engaging and able to carry on spontaneous conversations with the ativan and it was schedule at this time. We did spoke with mom and she was concern that this will affect her discharge Monday. I explained that outpt provider will need to do a very gentle taper to make sure sx's did not come back. Possible discharge Monday. Pt reports feeling better and denies sx's of psychosis and ambivalence at this time.         Patient was discharged to home to continue recovery in the community.    PE: (reviewed) and labs (see medical H&PE)  Labs:    Admission on 04/06/2021, Discharged on 04/12/2021   Component Date Value Ref Range Status    Cholesterol, Total 04/07/2021 205* 0 - 199 mg/dL Final    Triglycerides 04/07/2021 72  0 - 150 mg/dL Final    HDL 04/07/2021 37* 40 - 60 mg/dL Final    LDL Calculated 04/07/2021 154* <100 mg/dL Final    VLDL Cholesterol Calculated 04/07/2021 14  Not Established mg/dL Final    Hemoglobin A1C 04/07/2021 5.6  See comment % Final    Comment: Comment:  Diagnosis of Diabetes: > or = 6.5%  Increased risk of diabetes (Prediabetes): 5.7-6.4%  Glycemic Control: Nonpregnant Adults: <7.0%                    Pregnant: <6.0%        eAG 04/07/2021 114.0  mg/dL Final    TSH 04/07/2021 2.75  0.43 - 4.00 uIU/mL Final        Mental Status Exam at Discharge:  Level of consciousness:  awake  Appearance:  well-appearing, in chair, good grooming and good hygiene well-developed, well-nourished  Behavior/Motor:  no abnormalities noted normal gait and station AIMS: 0  Attitude toward examiner:  cooperative, attentive and good eye contact  Speech:  spontaneous, normal rate, normal volume and well articulated  Mood:  dysthymic  Affect:  mood congruent Anxiety: mild  Hallucinations: Absent  Thought processes:  coherent Attention span, Concentration & Attention:  attention span and concentration were age appropriate  Thought content:   no evidence of delusions OCD: none    Insight: normal insight and judgment Cognition:  oriented to person, place, and time  Fund of Knowledge: average  IQ:average Memory: intact  Suicide:  No specific plan to harm self  Sleep: sleeps through the night  Appetite: ok   Reassess Amanda Risk:  no specific plan to harm self Pt has phone numbers to contact if suicidal thoughts recur and states pt will return to the hospital if suicidal feelings return. Hospital Routine Meds:     Hospital PRN Meds:    Discharge Meds:    Discharge Medication List as of 4/12/2021 10:40 AM           Details   LORazepam (ATIVAN) 1 MG tablet Take 1 tablet by mouth 3 times daily for 14 days. , Disp-42 tablet, R-0Print      lisinopril-hydroCHLOROthiazide (PRINZIDE;ZESTORETIC) 10-12.5 MG per tablet Take 1 tablet by mouth daily, Disp-30 tablet, R-0Normal      risperiDONE (RISPERDAL) 1 MG tablet Take 1 tablet by mouth 2 times daily, Disp-60 tablet, R-0Normal      amLODIPine (NORVASC) 5 MG tablet Take 1 tablet by mouth daily, Disp-30 tablet, R-0Normal      metroNIDAZOLE (FLAGYL) 500 MG tablet Take 1 tablet by mouth every 12 hours for 5 days, Disp-10 tablet, R-0Normal              Details   citalopram (CELEXA) 20 MG tablet Take 1 tablet by mouth daily, Disp-30 tablet, R-0Normal                   Disposition - Residence Home    Follow Up:  See Discharge Instructions

## 2021-06-26 ENCOUNTER — HOSPITAL ENCOUNTER (EMERGENCY)
Age: 21
Discharge: HOME | End: 2021-06-26
Payer: COMMERCIAL

## 2021-06-26 VITALS
SYSTOLIC BLOOD PRESSURE: 165 MMHG | BODY MASS INDEX: 51.81 KG/M2 | TEMPERATURE: 98.9 F | WEIGHT: 293 LBS | DIASTOLIC BLOOD PRESSURE: 92 MMHG | OXYGEN SATURATION: 98 % | RESPIRATION RATE: 18 BRPM | HEART RATE: 72 BPM

## 2021-06-26 DIAGNOSIS — N64.4 BREAST PAIN, RIGHT: ICD-10-CM

## 2021-06-26 DIAGNOSIS — N64.3 GALACTORRHEA: Primary | ICD-10-CM

## 2021-06-26 LAB — HCG(URINE) PREGNANCY TEST: NEGATIVE

## 2021-06-26 PROCEDURE — 84703 CHORIONIC GONADOTROPIN ASSAY: CPT

## 2021-06-26 PROCEDURE — 99283 EMERGENCY DEPT VISIT LOW MDM: CPT

## 2021-06-26 RX ORDER — NAPROXEN 500 MG/1
250 TABLET ORAL 2 TIMES DAILY WITH MEALS
Qty: 20 TABLET | Refills: 0 | Status: SHIPPED | OUTPATIENT
Start: 2021-06-26 | End: 2021-07-06

## 2021-06-26 ASSESSMENT — PAIN SCALES - GENERAL: PAINLEVEL_OUTOF10: 0

## 2021-06-26 ASSESSMENT — ENCOUNTER SYMPTOMS
COLOR CHANGE: 0
CHEST TIGHTNESS: 0
GASTROINTESTINAL NEGATIVE: 1

## 2021-06-27 NOTE — ED NOTES
Provider order placed for patient's discharge. Provider reviewed decision to discharge with the patient. Discharge paperwork and any prescriptions were reviewed with the patient. Patient verbalized understanding of discharge education and any prescriptions and has no further questions or further needs at this time. Patient left with all personal belongings and was stable upon departure. Patient thanked for choosing [facility] ([facility]) and informed to return should any need arise.        Mitchell Hercules RN  06/26/21 1017

## 2021-06-27 NOTE — ED PROVIDER NOTES
[address]      Pt Name: Davin Fairchild  MRN: [id number]  [place] 2000  Date of evaluation: 6/26/2021  Provider: MARISSA Marley    This patient was not seen and evaluated by the attending physician No att. providers found. CHIEF COMPLAINT       Chief Complaint   Patient presents with    Breast Pain     right breast pain x 3 days, finished period today       CRITICAL CARE TIME   I performed a total Critical Care time of 15 minutes, excluding separately reportable procedures. There was a high probability of clinically significant/life threatening deterioration in the patient's condition which required my urgent intervention. Not limited to multiple reexaminations, discussions with attending physician and consultants. HISTORY OF PRESENT ILLNESS  (Location/Symptom, Timing/Onset, Context/Setting, Quality, Duration, Modifying Factors, Severity.)   Davin Fairchild is a 22 y.o. female who presents to the emergency department for right sided throbbing  breast pain. This pain started 3 days ago. Pain can radiate to left side, 8/10. Denies  No known injury. Has noticed bilateral white discharge when squeezed. Onset of white d/t was 2 months ago, recently started on Risperdal 3 moths ago. Patient denies noticing any new masses on self breast exam. Breasts not tender to touch. Not certain if family history of breast cancer. Patient is sexually active. Not on OBC. Occasionally uses condoms. First day of last period 6/22. Denies fevers, chills, muscle aches, changes in vision, CP, SOB, unintentional weight loss, changes in appetite. Nursing Notes were reviewed and I agree. REVIEW OF SYSTEMS    (2-9 systems for level 4, 10 or more for level 5)     Review of Systems   Constitutional: Negative for activity change, appetite change, chills, fatigue, fever and unexpected weight change. HENT: Negative.     Eyes: Negative for visual disturbance. Respiratory: Negative for chest tightness. Cardiovascular: Negative for chest pain and palpitations. Gastrointestinal: Negative. Endocrine: Negative for cold intolerance and heat intolerance. Genitourinary: Negative for dysuria, pelvic pain, vaginal bleeding, vaginal discharge and vaginal pain. Musculoskeletal: Negative for arthralgias and myalgias. Skin: Negative for color change, rash and wound. Allergic/Immunologic: Negative for immunocompromised state. Neurological: Negative for dizziness, weakness, light-headedness and headaches. Psychiatric/Behavioral: Negative for agitation, behavioral problems and confusion. Except as noted above the remainder of the review of systems was reviewed and negative. PAST MEDICAL HISTORY         Diagnosis Date    Depression     Hypertension     Mental disorder in childhood     Pt had been admitted x1 to [facility] with similar issues as this admit    Schizophrenic disorder ([facility] [address]        SURGICAL HISTORY     History reviewed. No pertinent surgical history. CURRENT MEDICATIONS       Discharge Medication List as of 6/26/2021 10:27 PM      CONTINUE these medications which have NOT CHANGED    Details   citalopram (CELEXA) 20 MG tablet Take 1 tablet by mouth daily, Disp-30 tablet, R-0Normal      lisinopril-hydroCHLOROthiazide (PRINZIDE;ZESTORETIC) 10-12.5 MG per tablet Take 1 tablet by mouth daily, Disp-30 tablet, R-0Normal      risperiDONE (RISPERDAL) 1 MG tablet Take 1 tablet by mouth 2 times daily, Disp-60 tablet, R-0Normal      amLODIPine (NORVASC) 5 MG tablet Take 1 tablet by mouth daily, Disp-30 tablet, R-0Normal             ALLERGIES     Patient has no known allergies. FAMILY HISTORY           Problem Relation Age of Onset    Mental Illness Mother      Family Status   Relation Name Status    Mother  (Not Specified)        SOCIAL HISTORY      reports that she has never smoked.  She has never used smokeless tobacco. She reports

## 2022-07-30 ENCOUNTER — HOSPITAL ENCOUNTER (EMERGENCY)
Age: 22
Discharge: HOME | End: 2022-07-30
Attending: EMERGENCY MEDICINE
Payer: COMMERCIAL

## 2022-07-30 VITALS
TEMPERATURE: 98.2 F | HEART RATE: 95 BPM | WEIGHT: 293 LBS | BODY MASS INDEX: 41.95 KG/M2 | HEIGHT: 70 IN | OXYGEN SATURATION: 97 % | RESPIRATION RATE: 17 BRPM | DIASTOLIC BLOOD PRESSURE: 94 MMHG | SYSTOLIC BLOOD PRESSURE: 176 MMHG

## 2022-07-30 DIAGNOSIS — R10.9 LEFT FLANK PAIN: Primary | ICD-10-CM

## 2022-07-30 LAB
BACTERIA WET PREP: ABNORMAL
BACTERIA: ABNORMAL /HPF
BILIRUBIN URINE: NEGATIVE
BLOOD, URINE: NEGATIVE
CLARITY: CLEAR
CLUE CELLS: ABNORMAL
COLOR: YELLOW
EPITHELIAL CELLS WET PREP: ABNORMAL
EPITHELIAL CELLS, UA: ABNORMAL /HPF (ref 0–5)
GLUCOSE URINE: NEGATIVE MG/DL
HCG(URINE) PREGNANCY TEST: NEGATIVE
KETONES, URINE: NEGATIVE MG/DL
LEUKOCYTE ESTERASE, URINE: ABNORMAL
MICROSCOPIC EXAMINATION: YES
NITRITE, URINE: NEGATIVE
PH UA: 6.5 (ref 5–8)
PROTEIN UA: NEGATIVE MG/DL
RBC UA: ABNORMAL /HPF (ref 0–4)
RBC WET PREP: ABNORMAL
SOURCE WET PREP: ABNORMAL
SPECIFIC GRAVITY UA: 1.02 (ref 1–1.03)
TRICHOMONAS PREP: ABNORMAL
URINE TYPE: ABNORMAL
UROBILINOGEN, URINE: 1 E.U./DL
WBC UA: ABNORMAL /HPF (ref 0–5)
WBC WET PREP: ABNORMAL
YEAST WET PREP: ABNORMAL

## 2022-07-30 PROCEDURE — 87491 CHLMYD TRACH DNA AMP PROBE: CPT

## 2022-07-30 PROCEDURE — 84703 CHORIONIC GONADOTROPIN ASSAY: CPT

## 2022-07-30 PROCEDURE — 81001 URINALYSIS AUTO W/SCOPE: CPT

## 2022-07-30 PROCEDURE — 6370000000 HC RX 637 (ALT 250 FOR IP): Performed by: EMERGENCY MEDICINE

## 2022-07-30 PROCEDURE — 87591 N.GONORRHOEAE DNA AMP PROB: CPT

## 2022-07-30 PROCEDURE — 87210 SMEAR WET MOUNT SALINE/INK: CPT

## 2022-07-30 PROCEDURE — 99283 EMERGENCY DEPT VISIT LOW MDM: CPT

## 2022-07-30 RX ORDER — LIDOCAINE 50 MG/G
1 PATCH TOPICAL DAILY
Qty: 10 PATCH | Refills: 0 | Status: SHIPPED | OUTPATIENT
Start: 2022-07-30 | End: 2022-08-09

## 2022-07-30 RX ORDER — LIDOCAINE 4 G/G
1 PATCH TOPICAL DAILY
Status: DISCONTINUED | OUTPATIENT
Start: 2022-07-30 | End: 2022-07-30 | Stop reason: HOSPADM

## 2022-07-30 ASSESSMENT — PAIN SCALES - GENERAL
PAINLEVEL_OUTOF10: 0
PAINLEVEL_OUTOF10: 0

## 2022-07-30 ASSESSMENT — PAIN - FUNCTIONAL ASSESSMENT
PAIN_FUNCTIONAL_ASSESSMENT: 0-10
PAIN_FUNCTIONAL_ASSESSMENT: 0-10

## 2022-07-30 NOTE — DISCHARGE INSTRUCTIONS
1. Tylenol or ibuprofen over-the-counter for pain and escalate to patch as needed. 2.  Follow-up with your primary care physician in the next couple of days call for an appointment. 3.  No lifting greater than 20 pounds for 10 days. 4.  Return emerge apartment for severe worsening pain or focal neurologic complaints.

## 2022-07-30 NOTE — ED NOTES
Patient verbalized understanding of discharge instructions and follow-up care. Patient was given 1 prescription and verbalized understanding of instructions for said prescription. Breathing regular, no distress, skin color, texture, turgor normal. No rashes or lesions, patient alert and oriented X3. Patient ambulated out of emergency department with steady gait.      Rocio Gasca RN  07/30/22 1031

## 2022-07-30 NOTE — Clinical Note
Edy Gallardo was seen and treated in our emergency department on 7/30/2022. She may return to work on 08/04/2022. If you have any questions or concerns, please don't hesitate to call.       Jim Cortez MD

## 2022-08-02 LAB
C TRACH DNA GENITAL QL NAA+PROBE: NEGATIVE
N. GONORRHOEAE DNA: NEGATIVE

## 2022-08-07 ASSESSMENT — ENCOUNTER SYMPTOMS
BACK PAIN: 0
NAUSEA: 0
VOMITING: 0
ABDOMINAL PAIN: 0
SHORTNESS OF BREATH: 0

## 2022-08-08 NOTE — ED PROVIDER NOTES
[address] ENCOUNTER      Pt Name: Diego James  MRN: [id number]  [place] 2000  Date of evaluation: 7/30/2022  Provider: Edson Brown MD    [address] [place] [address]       Chief Complaint   Patient presents with    Flank Pain     Bilateral flank pain since last week, feels like something in eating her up         HISTORY OF PRESENT ILLNESS   (Location/Symptom, Timing/Onset, Context/Setting, Quality, Duration, Modifying Factors, Severity)  Note limiting factors. Diego James is a 25 y.o. female who presents to the emergency department pain. HPI    Is a pleasant 67-year-old female who presents with intermittent bilateral flank pain since last week. Describes it as dull achy currently is pain-free she rates about 1 or 2 out of 10 when it occurs. Somewhat worse with movements relieved by rest no other aggravating leaving factors. She denies any dysuria urgency or frequency no fevers or chills. Nursing Notes were reviewed. REVIEW OF SYSTEMS    (2-9 systems for level 4, 10 or more for level 5)     Review of Systems   Constitutional:  Negative for chills and fever. Respiratory:  Negative for shortness of breath. Cardiovascular:  Negative for chest pain. Gastrointestinal:  Negative for abdominal pain, nausea and vomiting. Genitourinary:  Positive for flank pain. Negative for dysuria, urgency, vaginal bleeding and vaginal discharge. Musculoskeletal:  Negative for back pain. All other systems reviewed and are negative. Except as noted above the remainder of the review of systems was reviewed and negative. PAST MEDICAL HISTORY     Past Medical History:   Diagnosis Date    Depression     Hypertension     Mental disorder in childhood     Pt had been admitted [facility]1 to [facility] with similar issues as this admit    Schizophrenic disorder ([facility] [address]          SURGICAL HISTORY     No past surgical history on file.       CURRENT MEDICATIONS       Discharge Medication List as of 7/30/2022  6:11 PM        CONTINUE these medications which have NOT CHANGED    Details   naproxen (NAPROSYN) 500 MG tablet Take 0.5 tablets by mouth 2 times daily (with meals) for 20 doses Do not take with ibuprofen or other NSAIDs or anti-inflammatories, Disp-20 tablet, R-0Print      citalopram (CELEXA) 20 MG tablet Take 1 tablet by mouth daily, Disp-30 tablet, R-0Normal      lisinopril-hydroCHLOROthiazide (PRINZIDE;ZESTORETIC) 10-12.5 MG per tablet Take 1 tablet by mouth daily, Disp-30 tablet, R-0Normal      risperiDONE (RISPERDAL) 1 MG tablet Take 1 tablet by mouth 2 times daily, Disp-60 tablet, R-0Normal      amLODIPine (NORVASC) 5 MG tablet Take 1 tablet by mouth daily, Disp-30 tablet, R-0Normal             ALLERGIES     Patient has no known allergies. FAMILY HISTORY       Family History   Problem Relation Age of Onset    Mental Illness Mother           SOCIAL HISTORY       Social History     Socioeconomic History    Marital status: Single    Number of children: 0    Years of education: 12   Tobacco Use    Smoking status: Never    Smokeless tobacco: Never   Vaping Use    Vaping Use: Never used   Substance and Sexual Activity    Alcohol use: Yes     Comment: ocassionally    Drug use: Yes     Types: Marijuana (Weed)     Comment: sometimes    Sexual activity: Not Currently       SCREENINGS         Chloe Coma Scale  Eye Opening: Spontaneous  Best Verbal Response: Oriented  Best Motor Response: Obeys commands  Chloe Coma Scale Score: 15                     CIWA Assessment  BP: (!) 176/94  Heart Rate: 95                 PHYSICAL EXAM    (up to 7 for level 4, 8 or more for level 5)     ED Triage Vitals [07/30/22 1701]   BP Temp Temp Source Heart Rate Resp SpO2 Height Weight   (!) 176/94 98.2 °F (36.8 °C) Oral 95 17 97 % 5' 10\" (1.778 m) (!) 418 lb 6.9 oz (189.8 kg)       Physical Exam  Constitutional:       General: She is not in acute distress. Appearance: Normal appearance.    HENT: Head: Normocephalic and atraumatic. Right Ear: Tympanic membrane and ear canal normal.      Left Ear: Tympanic membrane and ear canal normal.      Nose: Nose normal. No congestion. Mouth/Throat:      Mouth: Mucous membranes are moist.      Pharynx: No oropharyngeal exudate. Eyes:      Extraocular Movements: Extraocular movements intact. Pupils: Pupils are equal, round, and reactive to light. Cardiovascular:      Rate and Rhythm: Normal rate and regular rhythm. Pulses: Normal pulses. Heart sounds: Normal heart sounds. Pulmonary:      Effort: Pulmonary effort is normal.      Breath sounds: Normal breath sounds. Abdominal:      General: Bowel sounds are normal.      Palpations: Abdomen is soft. Tenderness: There is no abdominal tenderness. There is no guarding or rebound. Musculoskeletal:         General: Normal range of motion. Cervical back: Normal range of motion. No rigidity. Back:    Skin:     General: Skin is warm and dry. Capillary Refill: Capillary refill takes less than 2 seconds. Neurological:      Mental Status: She is alert and oriented to person, place, and time.    Psychiatric:         Mood and Affect: Mood normal.         Behavior: Behavior normal.       DIAGNOSTIC RESULTS     EKG: All EKG's are interpreted by the Emergency Department Physician who either signs or Co-signs this chart in the absence of a cardiologist.        RADIOLOGY:   Non-plain film images such as CT, Ultrasound and MRI are read by the radiologist. Plain radiographic images are visualized and preliminarily interpreted by the emergency physician with the below findings:        Interpretation per the Radiologist below, if available at the time of this note:    No orders to display         ED BEDSIDE ULTRASOUND:   Performed by ED Physician - none    LABS:  Labs Reviewed   WET PREP, GENITAL - Abnormal; Notable for the following components:       Result Value    Yeast, Wet Prep <1+ (*)     All other components within normal limits   URINALYSIS WITH MICROSCOPIC - Abnormal; Notable for the following components:    Leukocyte Esterase, Urine TRACE (*)     Epithelial Cells, UA 6-10 (*)     Bacteria, UA Rare (*)     All other components within normal limits   C.TRACHOMATIS N.GONORRHOEAE DNA   PREGNANCY, URINE       All other labs were within normal range or not returned as of this dictation. EMERGENCY DEPARTMENT COURSE and DIFFERENTIAL DIAGNOSIS/MDM:   Vitals:    Vitals:    07/30/22 1701   BP: (!) 176/94   Pulse: 95   Resp: 17   Temp: 98.2 °F (36.8 °C)   TempSrc: Oral   SpO2: 97%   Weight: (!) 418 lb 6.9 oz (189.8 kg)   Height: 5' 10\" (1.778 m)       The above history and physical exam were performed. I reviewed her past medical past cervical social family history. 12 point review of symptoms performed is negative as otherwise noted. MDM    Due to the diagnosis of UTI versus ectopic pregnancy versus acute intra-abdominal surgical process versus musculoskeletal back pain. At this point time her work-up is unremarkable and her abdominal exam is benign. I believe she has a significant musculoskeletal etiology to her back pain. REASSESSMENT          CRITICAL CARE TIME   Total Critical Care time was  minutes, excluding separately reportable procedures. There was a high probability of clinically significant/life threatening deterioration in the patient's condition which required my urgent intervention. CONSULTS:  None    PROCEDURES:  Unless otherwise noted below, none     Procedures        FINAL IMPRESSION      1.  Left flank pain          DISPOSITION/PLAN   DISPOSITION Decision To Discharge 07/30/2022 06:02:06 PM      PATIENT REFERRED TO:  [address] [place] [address]  [address]  [address]  [phone]    Schedule an appointment as soon as possible for a visit in 1 week      DISCHARGE MEDICATIONS:  Discharge Medication List as of 7/30/2022  6:11 PM        START taking these medications    Details   lidocaine (LIDODERM) 5 % Place 1 patch onto the skin in the morning for 10 days. 12 hours on, 12 hours off. ., Disp-10 patch, R-0Print           Controlled Substances Monitoring:     No flowsheet data found.     (Please note that portions of this note were completed with a voice recognition program.  Efforts were made to edit the dictations but occasionally words are mis-transcribed.)    Blaine Porter MD (electronically signed)  Attending Emergency Physician          Blaine Porter MD  08/07/22 5502

## 2024-11-22 ENCOUNTER — HOSPITAL ENCOUNTER (EMERGENCY)
Age: 24
Discharge: HOME | End: 2024-11-22
Attending: EMERGENCY MEDICINE
Payer: COMMERCIAL

## 2024-11-22 VITALS
DIASTOLIC BLOOD PRESSURE: 86 MMHG | SYSTOLIC BLOOD PRESSURE: 146 MMHG | OXYGEN SATURATION: 100 % | RESPIRATION RATE: 14 BRPM | TEMPERATURE: 98.4 F | HEART RATE: 75 BPM | WEIGHT: 251.54 LBS | BODY MASS INDEX: 36.09 KG/M2

## 2024-11-22 DIAGNOSIS — N76.0 BACTERIAL VAGINOSIS: Primary | ICD-10-CM

## 2024-11-22 DIAGNOSIS — N30.00 ACUTE CYSTITIS WITHOUT HEMATURIA: ICD-10-CM

## 2024-11-22 DIAGNOSIS — B96.89 BACTERIAL VAGINOSIS: Primary | ICD-10-CM

## 2024-11-22 LAB
BACTERIA GENITAL QL WET PREP: ABNORMAL
BACTERIA URNS QL MICRO: ABNORMAL /HPF
BILIRUB UR QL STRIP.AUTO: ABNORMAL
CLARITY UR: CLEAR
CLUE CELLS SPEC QL WET PREP: ABNORMAL
COLOR UR: YELLOW
EPI CELLS #/AREA URNS HPF: ABNORMAL /HPF (ref 0–5)
EPI CELLS SPEC QL WET PREP: ABNORMAL
GLUCOSE UR STRIP.AUTO-MCNC: NEGATIVE MG/DL
HCG UR QL: NEGATIVE
HGB UR QL STRIP.AUTO: NEGATIVE
KETONES UR STRIP.AUTO-MCNC: ABNORMAL MG/DL
LEUKOCYTE ESTERASE UR QL STRIP.AUTO: ABNORMAL
MUCOUS THREADS #/AREA URNS LPF: ABNORMAL /LPF
NITRITE UR QL STRIP.AUTO: NEGATIVE
PH UR STRIP.AUTO: 6 [PH] (ref 5–8)
PROT UR STRIP.AUTO-MCNC: ABNORMAL MG/DL
RBC #/AREA URNS HPF: ABNORMAL /HPF (ref 0–4)
RBC SPEC QL WET PREP: ABNORMAL
SP GR UR STRIP.AUTO: >=1.03 (ref 1–1.03)
SPECIMEN SOURCE FLD: ABNORMAL
T VAGINALIS GENITAL QL WET PREP: ABNORMAL
TRICHOMONAS #/AREA URNS HPF: ABNORMAL /HPF
UA COMPLETE W REFLEX CULTURE PNL UR: YES
UA DIPSTICK W REFLEX MICRO PNL UR: YES
URN SPEC COLLECT METH UR: ABNORMAL
UROBILINOGEN UR STRIP-ACNC: 1 E.U./DL
WBC #/AREA URNS HPF: ABNORMAL /HPF (ref 0–5)
WBC SPEC QL WET PREP: ABNORMAL
YEAST GENITAL QL WET PREP: ABNORMAL

## 2024-11-22 PROCEDURE — 81001 URINALYSIS AUTO W/SCOPE: CPT

## 2024-11-22 PROCEDURE — 87210 SMEAR WET MOUNT SALINE/INK: CPT

## 2024-11-22 PROCEDURE — 87491 CHLMYD TRACH DNA AMP PROBE: CPT

## 2024-11-22 PROCEDURE — 99283 EMERGENCY DEPT VISIT LOW MDM: CPT

## 2024-11-22 PROCEDURE — 87086 URINE CULTURE/COLONY COUNT: CPT

## 2024-11-22 PROCEDURE — 87591 N.GONORRHOEAE DNA AMP PROB: CPT

## 2024-11-22 PROCEDURE — 84703 CHORIONIC GONADOTROPIN ASSAY: CPT

## 2024-11-22 RX ORDER — CEFUROXIME AXETIL 250 MG/1
250 TABLET ORAL 2 TIMES DAILY
Qty: 14 TABLET | Refills: 0 | Status: SHIPPED | OUTPATIENT
Start: 2024-11-22 | End: 2024-11-29

## 2024-11-22 RX ORDER — METRONIDAZOLE 500 MG/1
500 TABLET ORAL 2 TIMES DAILY
Qty: 14 TABLET | Refills: 0 | Status: SHIPPED | OUTPATIENT
Start: 2024-11-22 | End: 2024-11-29

## 2024-11-22 RX ORDER — METRONIDAZOLE 500 MG/1
500 TABLET ORAL 2 TIMES DAILY
Qty: 14 TABLET | Refills: 0 | Status: SHIPPED | OUTPATIENT
Start: 2024-11-22 | End: 2024-11-22

## 2024-11-22 RX ORDER — CEFUROXIME AXETIL 250 MG/1
250 TABLET ORAL 2 TIMES DAILY
Qty: 14 TABLET | Refills: 0 | Status: SHIPPED | OUTPATIENT
Start: 2024-11-22 | End: 2024-11-22

## 2024-11-22 ASSESSMENT — PAIN SCALES - GENERAL: PAINLEVEL_OUTOF10: 2

## 2024-11-22 ASSESSMENT — PAIN DESCRIPTION - LOCATION: LOCATION: MOUTH

## 2024-11-22 ASSESSMENT — PAIN - FUNCTIONAL ASSESSMENT
PAIN_FUNCTIONAL_ASSESSMENT: 0-10
PAIN_FUNCTIONAL_ASSESSMENT: NONE - DENIES PAIN

## 2024-11-22 ASSESSMENT — LIFESTYLE VARIABLES
HOW OFTEN DO YOU HAVE A DRINK CONTAINING ALCOHOL: NEVER
HOW MANY STANDARD DRINKS CONTAINING ALCOHOL DO YOU HAVE ON A TYPICAL DAY: PATIENT DOES NOT DRINK

## 2024-11-22 NOTE — ED PROVIDER NOTES
EMERGENCY DEPARTMENT ENCOUNTER     [facility]     Pt Name: Norma Durant   MRN: [id number]   Birthdate 2000   Date of evaluation: 11/22/2024   Provider: Nicho Sandy MD   PCP: Mike Clemente DO   Note Started: 8:56 AM EST 11/22/24     CHIEF COMPLAINT     Chief Complaint   Patient presents with    Pain     Pain under her tongue     Vaginitis     Vaginal irritation x 1 week, denies itching , denies d.c         HISTORY OF PRESENT ILLNESS:  History from : Patient   Limitations to history : None     Norma Durant is a 24 y.o. female who  has a past medical history of Hypertension, Mental disorder in childhood, and Schizophrenic disorder (HCC). presents to the emergency room complaining of an area of pain under the tongue that she first noticed this morning.  Patient states that she had a burger last night that she made at home and thought maybe the meat had gone bad.  She denies any swelling in the mouth.  No difficulty swallowing.  No dental pain.  No fevers.  No vomiting.  Patient also requesting to be tested for possible yeast infection.  States she has had some white vaginal discharge.  She denies any vaginal bleeding.  No hematuria or dysuria.  No associated abdominal pain.  Normal bowel movements.  States she is sexually active.    Nursing Notes were all reviewed and agreed with or any disagreements were addressed in the HPI.     ROS: Positives and Pertinent negatives as per HPI.    PAST MEDICAL HISTORY     Past medical history:  has a past medical history of Hypertension, Mental disorder in childhood, and Schizophrenic disorder (HCC).    Past surgical history: Denies any past surgical history      PHYSICAL EXAM:  Vitals:    11/22/24 0903   BP: (!) 146/86   Pulse: 75   Resp: 14   Temp: 98.4 °F (36.9 °C)   TempSrc: Oral   SpO2: 100%   Weight: 114.1 kg (251 lb 8.7 oz)        CONSTITUTIONAL: AOx4, NAD, cooperative with exam, afebrile   HEAD: normocephalic,

## 2024-11-22 NOTE — DISCHARGE INSTRUCTIONS
Call today or tomorrow to follow up with Mike Clemente, DO  in 3-4 days.    Take your medication as indicated, if you are given an antibiotic then make sure you get the prescription filled and take the antibiotics until finished.       Do not drink alcohol while taking the Flagyl medication.  Drink plenty of water while taking the antibiotics.  Avoid drinking alcohol while taking antibiotics.      [employer], [employer] has some antibiotics for free; [facility] and [facility] has a 4 dollar prescription plan for some antibiotics.     Return to the Emergency Department for fever > 101.5, inability to urinate, burning when you urinate, increase in the number of times or if you have an urgency to urinate, any other care or concern.

## 2024-11-24 LAB — BACTERIA UR CULT: NORMAL

## 2024-11-25 LAB
C TRACH DNA CVX QL NAA+PROBE: NEGATIVE
N GONORRHOEA DNA CERV MUCUS QL NAA+PROBE: NEGATIVE

## 2025-03-17 ENCOUNTER — HOSPITAL ENCOUNTER (EMERGENCY)
Age: 25
Discharge: HOME | End: 2025-03-17
Payer: COMMERCIAL

## 2025-03-17 VITALS
BODY MASS INDEX: 36.51 KG/M2 | RESPIRATION RATE: 16 BRPM | DIASTOLIC BLOOD PRESSURE: 101 MMHG | HEIGHT: 69 IN | TEMPERATURE: 98.2 F | HEART RATE: 85 BPM | WEIGHT: 246.47 LBS | OXYGEN SATURATION: 100 % | SYSTOLIC BLOOD PRESSURE: 150 MMHG

## 2025-03-17 DIAGNOSIS — A59.01 TRICHOMONAS VAGINITIS: Primary | ICD-10-CM

## 2025-03-17 LAB
BACTERIA GENITAL QL WET PREP: ABNORMAL
BACTERIA URNS QL MICRO: ABNORMAL /HPF
BILIRUB UR QL STRIP.AUTO: NEGATIVE
CLARITY UR: CLEAR
CLUE CELLS SPEC QL WET PREP: ABNORMAL
COLOR UR: YELLOW
EPI CELLS #/AREA URNS HPF: ABNORMAL /HPF (ref 0–5)
EPI CELLS SPEC QL WET PREP: ABNORMAL
GLUCOSE UR STRIP.AUTO-MCNC: NEGATIVE MG/DL
HCG UR QL: NEGATIVE
HGB UR QL STRIP.AUTO: NEGATIVE
KETONES UR STRIP.AUTO-MCNC: ABNORMAL MG/DL
LEUKOCYTE ESTERASE UR QL STRIP.AUTO: ABNORMAL
MUCOUS THREADS #/AREA URNS LPF: ABNORMAL /LPF
NITRITE UR QL STRIP.AUTO: NEGATIVE
PH UR STRIP.AUTO: 5.5 [PH] (ref 5–8)
PROT UR STRIP.AUTO-MCNC: NEGATIVE MG/DL
RBC #/AREA URNS HPF: ABNORMAL /HPF (ref 0–4)
RBC SPEC QL WET PREP: ABNORMAL
SP GR UR STRIP.AUTO: >=1.03 (ref 1–1.03)
SPECIMEN SOURCE FLD: ABNORMAL
T VAGINALIS GENITAL QL WET PREP: ABNORMAL
TRICHOMONAS #/AREA URNS HPF: ABNORMAL /HPF
UA COMPLETE W REFLEX CULTURE PNL UR: YES
UA DIPSTICK W REFLEX MICRO PNL UR: YES
URN SPEC COLLECT METH UR: ABNORMAL
UROBILINOGEN UR STRIP-ACNC: 0.2 E.U./DL
WBC #/AREA URNS HPF: ABNORMAL /HPF (ref 0–5)
WBC SPEC QL WET PREP: ABNORMAL
YEAST GENITAL QL WET PREP: ABNORMAL

## 2025-03-17 PROCEDURE — 6360000002 HC RX W HCPCS: Performed by: PHYSICIAN ASSISTANT

## 2025-03-17 PROCEDURE — 99284 EMERGENCY DEPT VISIT MOD MDM: CPT

## 2025-03-17 PROCEDURE — 87210 SMEAR WET MOUNT SALINE/INK: CPT

## 2025-03-17 PROCEDURE — 96372 THER/PROPH/DIAG INJ SC/IM: CPT

## 2025-03-17 PROCEDURE — 81001 URINALYSIS AUTO W/SCOPE: CPT

## 2025-03-17 PROCEDURE — 87591 N.GONORRHOEAE DNA AMP PROB: CPT

## 2025-03-17 PROCEDURE — 87086 URINE CULTURE/COLONY COUNT: CPT

## 2025-03-17 PROCEDURE — 84703 CHORIONIC GONADOTROPIN ASSAY: CPT

## 2025-03-17 PROCEDURE — 87491 CHLMYD TRACH DNA AMP PROBE: CPT

## 2025-03-17 RX ORDER — DOXYCYCLINE HYCLATE 100 MG
100 TABLET ORAL 2 TIMES DAILY
Qty: 14 TABLET | Refills: 0 | Status: SHIPPED | OUTPATIENT
Start: 2025-03-17 | End: 2025-03-24

## 2025-03-17 RX ORDER — METRONIDAZOLE 500 MG/1
500 TABLET ORAL 2 TIMES DAILY
Qty: 14 TABLET | Refills: 0 | Status: SHIPPED | OUTPATIENT
Start: 2025-03-17 | End: 2025-03-24

## 2025-03-17 RX ORDER — CEFTRIAXONE 500 MG/1
500 INJECTION, POWDER, FOR SOLUTION INTRAMUSCULAR; INTRAVENOUS ONCE
Status: COMPLETED | OUTPATIENT
Start: 2025-03-17 | End: 2025-03-17

## 2025-03-17 RX ORDER — LURASIDONE HYDROCHLORIDE 40 MG/1
TABLET, FILM COATED ORAL
COMMUNITY

## 2025-03-17 RX ADMIN — CEFTRIAXONE SODIUM 500 MG: 500 INJECTION, POWDER, FOR SOLUTION INTRAMUSCULAR; INTRAVENOUS at 14:32

## 2025-03-17 ASSESSMENT — PAIN - FUNCTIONAL ASSESSMENT
PAIN_FUNCTIONAL_ASSESSMENT: NONE - DENIES PAIN
PAIN_FUNCTIONAL_ASSESSMENT: NONE - DENIES PAIN

## 2025-03-17 NOTE — ED PROVIDER NOTES
with the patient and addressed the patient's questions and concerns. Important warning signs as well as new or worsening symptoms which would necessitate immediate return to the ED were discussed. The plan is to discharge from the ED at this time, and the patient is in stable condition. The patient acknowledged understanding is agreeable with this plan.      Disposition Considerations (tests considered but not done, Admit vs D/C, Shared Decision Making, Pt Expectation of Test or Tx.): see above       I am the Primary Clinician of Record.  FINAL IMPRESSION      1. Trichomonas vaginitis          DISPOSITION/PLAN     DISPOSITION Decision To Discharge 03/17/2025 02:17:18 PM   DISPOSITION CONDITION Stable           PATIENT REFERRED TO:  [facility] Department  [address] [facility] [address] [phone]  Go to   If symptoms worsen    Mike Clemente DO  [address] [facility] [address] [phone]    Schedule an appointment as soon as possible for a visit in 1 week  For follow up and reevaluation.      DISCHARGE MEDICATIONS:  New Prescriptions    DOXYCYCLINE HYCLATE (VIBRA-TABS) 100 MG TABLET    Take 1 tablet by mouth 2 times daily for 7 days    METRONIDAZOLE (FLAGYL) 500 MG TABLET    Take 1 tablet by mouth 2 times daily for 7 days       DISCONTINUED MEDICATIONS:  Discontinued Medications    No medications on file              (Please note that portions of this note were completed with a voice recognition program.  Efforts were made to edit the dictations but occasionally words are mis-transcribed.)    Beverly Cornell PA-C (electronically signed)          Beverly Cornell PA-C  03/17/25 6644

## 2025-03-17 NOTE — DISCHARGE INSTRUCTIONS
You tested positive for trichomonas which is an STD and BV.  The treatment for above are Flagyl.  Take this as prescribed.  Make sure you take this with food in your stomach as it can be irritating and do not drink alcohol.  You are being covered for gonorrhea and chlamydia.  You received a shot for gonorrhea in the ED and are discharged home on doxycycline to cover for chlamydia.  Your urine showed some signs of infection however given your lack of symptoms we will hold additional antibiotics.  If your urine culture is positive we will contact you.  If gonorrhea or chlamydia is positive we will contact you.  Informed partner to be tested and treated.  No intercourse for 1 week after treatment of both parties.    Your blood pressure is elevated in the ED.  Follow-up with primary care doctor as an outpatient for blood pressure check.

## 2025-03-18 ENCOUNTER — RESULTS FOLLOW-UP (OUTPATIENT)
Dept: EMERGENCY DEPT | Age: 25
End: 2025-03-18

## 2025-03-18 LAB
BACTERIA UR CULT: NORMAL
C TRACH DNA CVX QL NAA+PROBE: NEGATIVE
N GONORRHOEA DNA CERV MUCUS QL NAA+PROBE: NEGATIVE

## 2025-04-11 ENCOUNTER — HOSPITAL ENCOUNTER (EMERGENCY)
Age: 25
Discharge: HOME | End: 2025-04-11
Payer: COMMERCIAL

## 2025-04-11 VITALS
RESPIRATION RATE: 16 BRPM | OXYGEN SATURATION: 100 % | DIASTOLIC BLOOD PRESSURE: 94 MMHG | WEIGHT: 246.91 LBS | SYSTOLIC BLOOD PRESSURE: 151 MMHG | BODY MASS INDEX: 36.46 KG/M2 | HEART RATE: 83 BPM | TEMPERATURE: 98.6 F

## 2025-04-11 DIAGNOSIS — I10 HYPERTENSION, UNSPECIFIED TYPE: Primary | ICD-10-CM

## 2025-04-11 DIAGNOSIS — Z76.0 PRESCRIPTION REFILL: ICD-10-CM

## 2025-04-11 DIAGNOSIS — Z20.2 STD EXPOSURE: ICD-10-CM

## 2025-04-11 LAB
BACTERIA GENITAL QL WET PREP: NORMAL
BILIRUB UR QL STRIP.AUTO: NEGATIVE
CLARITY UR: CLEAR
CLUE CELLS SPEC QL WET PREP: NORMAL
COLOR UR: YELLOW
EPI CELLS SPEC QL WET PREP: NORMAL
GLUCOSE UR STRIP.AUTO-MCNC: NEGATIVE MG/DL
HCG UR QL: NEGATIVE
HGB UR QL STRIP.AUTO: NEGATIVE
KETONES UR STRIP.AUTO-MCNC: NEGATIVE MG/DL
LEUKOCYTE ESTERASE UR QL STRIP.AUTO: NEGATIVE
NITRITE UR QL STRIP.AUTO: NEGATIVE
PH UR STRIP.AUTO: 6 [PH] (ref 5–8)
PROT UR STRIP.AUTO-MCNC: NEGATIVE MG/DL
RBC SPEC QL WET PREP: NORMAL
SP GR UR STRIP.AUTO: 1.01 (ref 1–1.03)
SPECIMEN SOURCE FLD: NORMAL
T VAGINALIS GENITAL QL WET PREP: NORMAL
UA COMPLETE W REFLEX CULTURE PNL UR: NORMAL
UA DIPSTICK W REFLEX MICRO PNL UR: NORMAL
URN SPEC COLLECT METH UR: NORMAL
UROBILINOGEN UR STRIP-ACNC: 0.2 E.U./DL
WBC SPEC QL WET PREP: NORMAL
YEAST GENITAL QL WET PREP: NORMAL

## 2025-04-11 PROCEDURE — 99283 EMERGENCY DEPT VISIT LOW MDM: CPT

## 2025-04-11 PROCEDURE — 87591 N.GONORRHOEAE DNA AMP PROB: CPT

## 2025-04-11 PROCEDURE — 81003 URINALYSIS AUTO W/O SCOPE: CPT

## 2025-04-11 PROCEDURE — 87491 CHLMYD TRACH DNA AMP PROBE: CPT

## 2025-04-11 PROCEDURE — 87210 SMEAR WET MOUNT SALINE/INK: CPT

## 2025-04-11 PROCEDURE — 84703 CHORIONIC GONADOTROPIN ASSAY: CPT

## 2025-04-11 RX ORDER — LISINOPRIL AND HYDROCHLOROTHIAZIDE 10; 12.5 MG/1; MG/1
1 TABLET ORAL DAILY
Qty: 30 TABLET | Refills: 0 | Status: SHIPPED | OUTPATIENT
Start: 2025-04-11 | End: 2025-05-11

## 2025-04-11 ASSESSMENT — PAIN - FUNCTIONAL ASSESSMENT: PAIN_FUNCTIONAL_ASSESSMENT: NONE - DENIES PAIN

## 2025-04-11 NOTE — ED PROVIDER NOTES
[facility] EMERGENCY DEPARTMENT  EMERGENCY DEPARTMENT ENCOUNTER        Pt Name: Norma Durant  MRN: [id number]  Birthdate 2000  Date of evaluation: 4/11/2025  Provider: Nely Weber PA-C  PCP: Mike Clemente DO  Note Started: 7:15 PM EDT 4/11/25      NAHED. I have evaluated this patient.        CHIEF COMPLAINT       Chief Complaint   Patient presents with    Hypertension     Pt reports elevated blood pressure, states she is out of her HCTZ, states sh ehas been out of it for a couple of week. States she also takes Amlodipine for her BP and thought that would have been enough to manage it at home. Pt states she took her BP and it was 164/ 84.        HISTORY OF PRESENT ILLNESS: 1 or more Elements     History From: patient            Chief Complaint: hypertension    Norma Durant is a 24 y.o. female who presents to the emergency department with complaint of elevated blood pressure.  She was checking her blood pressure at home and is 164/84.  She states that she is taking her amlodipine, but is out of her lisinopril/hydrochlorothiazide.  She thought that since being out of that medication her amlodipine would be able to keep her blood pressure under control.  She denies any chest pain, shortness of breath, dizziness, lightheadedness, blurred vision today.  Denies fevers, chills or recent illness.    Nursing Notes were all reviewed and agreed with or any disagreements were addressed in the HPI.    REVIEW OF SYSTEMS :      Review of Systems    Positives and Pertinent negatives as per HPI.     SURGICAL HISTORY     Past Surgical History:   Procedure Laterality Date    ABDOMEN SURGERY         CURRENTMEDICATIONS       Discharge Medication List as of 4/11/2025  3:31 PM        CONTINUE these medications which have NOT CHANGED    Details   lurasidone (LATUDA) 40 MG TABS tablet Take by mouth Daily with supperHistorical Med      naproxen (NAPROSYN) 500 MG tablet Take 0.5 tablets by mouth 2 times  was then discharged in stable condition with strict return precautions if she starts to develop new or worsening symptoms    Disposition Considerations (tests considered but not done, Admit vs D/C, Shared Decision Making, Pt Expectation of Test or Tx.): As above    The patient tolerated their visit well.  The patient and / or the family were informed of the results of any tests, a time was given to answer questions, a plan was proposed and they agreed with plan.    I am the Primary Clinician of Record.  FINAL IMPRESSION      1. Hypertension, unspecified type    2. STD exposure    3. Prescription refill          DISPOSITION/PLAN     DISPOSITION Decision To Discharge 04/11/2025 03:13:21 PM   DISPOSITION CONDITION Stable           PATIENT REFERRED TO:  Mike Clemente DO  [address] [facility] [address] [phone]    Schedule an appointment as soon as possible for a visit in 1 day  for reevaluation    [facility] Emergency Department  [address] [facility] [id number] [phone]  Go to   As needed, If symptoms worsen      DISCHARGE MEDICATIONS:  Discharge Medication List as of 4/11/2025  3:31 PM          DISCONTINUED MEDICATIONS:  Discharge Medication List as of 4/11/2025  3:31 PM                 (Please note that portions of this note were completed with a voice recognition program.  Efforts were made to edit the dictations but occasionally words are mis-transcribed.)    Nely Weber PA-C (electronically signed)       Nely Weber PA-C  04/11/25 1409

## 2025-04-14 LAB
C TRACH DNA CVX QL NAA+PROBE: NEGATIVE
N GONORRHOEA DNA CERV MUCUS QL NAA+PROBE: NEGATIVE

## 2025-04-15 ENCOUNTER — RESULTS FOLLOW-UP (OUTPATIENT)
Dept: EMERGENCY DEPT | Age: 25
End: 2025-04-15

## 2025-04-23 ENCOUNTER — HOSPITAL ENCOUNTER (EMERGENCY)
Age: 25
Discharge: HOME | End: 2025-04-23
Payer: COMMERCIAL

## 2025-04-23 VITALS
DIASTOLIC BLOOD PRESSURE: 91 MMHG | BODY MASS INDEX: 36.05 KG/M2 | HEIGHT: 69 IN | OXYGEN SATURATION: 99 % | RESPIRATION RATE: 20 BRPM | HEART RATE: 84 BPM | WEIGHT: 243.39 LBS | SYSTOLIC BLOOD PRESSURE: 156 MMHG | TEMPERATURE: 98.8 F

## 2025-04-23 DIAGNOSIS — R51.9 NONINTRACTABLE HEADACHE, UNSPECIFIED CHRONICITY PATTERN, UNSPECIFIED HEADACHE TYPE: Primary | ICD-10-CM

## 2025-04-23 DIAGNOSIS — F41.1 ANXIETY STATE: ICD-10-CM

## 2025-04-23 PROCEDURE — 6370000000 HC RX 637 (ALT 250 FOR IP): Performed by: PHYSICIAN ASSISTANT

## 2025-04-23 PROCEDURE — 99283 EMERGENCY DEPT VISIT LOW MDM: CPT

## 2025-04-23 RX ORDER — ACETAMINOPHEN 500 MG
1000 TABLET ORAL ONCE
Status: COMPLETED | OUTPATIENT
Start: 2025-04-23 | End: 2025-04-23

## 2025-04-23 RX ORDER — IBUPROFEN 800 MG/1
800 TABLET, FILM COATED ORAL ONCE
Status: COMPLETED | OUTPATIENT
Start: 2025-04-23 | End: 2025-04-23

## 2025-04-23 RX ORDER — HYDROXYZINE PAMOATE 25 MG/1
25 CAPSULE ORAL 3 TIMES DAILY PRN
Qty: 21 CAPSULE | Refills: 0 | Status: SHIPPED | OUTPATIENT
Start: 2025-04-23 | End: 2025-04-30

## 2025-04-23 RX ORDER — HYDROXYZINE PAMOATE 25 MG/1
25 CAPSULE ORAL ONCE
Status: COMPLETED | OUTPATIENT
Start: 2025-04-23 | End: 2025-04-23

## 2025-04-23 RX ADMIN — ACETAMINOPHEN 1000 MG: 500 TABLET ORAL at 19:20

## 2025-04-23 RX ADMIN — IBUPROFEN 800 MG: 800 TABLET, FILM COATED ORAL at 19:20

## 2025-04-23 RX ADMIN — HYDROXYZINE PAMOATE 25 MG: 25 CAPSULE ORAL at 19:20

## 2025-04-23 ASSESSMENT — ENCOUNTER SYMPTOMS
VOMITING: 0
SORE THROAT: 0
SHORTNESS OF BREATH: 0
COUGH: 0
BACK PAIN: 0
EYE PAIN: 0
NAUSEA: 0
ABDOMINAL PAIN: 0

## 2025-04-23 NOTE — ED NOTES
Reviewed new orders with pt.  Pt denies HA and declines tylenol and motrin at this time.    Asking if she will get a dose of anxiety medicine here.   [facility] updated about pt's request.

## 2025-04-23 NOTE — ED PROVIDER NOTES
**ADVANCED PRACTICE PROVIDER, I HAVE EVALUATED THIS PATIENT**        [facility] EMERGENCY DEPARTMENT  EMERGENCY DEPARTMENT ENCOUNTER      Pt Name: Norma Durant  MRN:[id number]  Birthdate 2000  Date of evaluation: 4/23/2025  Provider: Quirino Estrada PA-C  Note Started: 6:39 PM EDT 4/23/25        Chief Complaint:    Chief Complaint   Patient presents with    Migraine     States has been having migraines on and off since last week    Anxiety     States having anxiety around night time; states she does this movement with her tongue when she feels anxious at night; pt states hx of anxiety/schizophrenia and used to take anxiety meds but stopped taking them due to her thinking she could control it at home         Nursing Notes, Past Medical Hx, Past Surgical Hx, Social Hx, Allergies, and Family Hx were all reviewed and agreed with or any disagreements were addressed in the HPI.    HPI: (Location, Duration, Timing, Severity, Quality, Assoc Sx, Context, Modifying factors)    History From: Patient  Limitations to history : None    Social Determinants Significantly Affecting Health : None    Chief Complaint of headache.  Patient complain of having a headache off and on for the last week week and a half.  Stated will come and go.  Is mainly on the left front.  Sometimes be on the right.  She denies any nausea vomiting.  No lightheaded or dizziness.  No neck pain or stiffness.  Denies fever.  No visual changes.  No sensitivity to light or loud noise.  No previous history of migraines.  She does complain also having some anxiety.  She has been having issue with her boyfriend or ex-boyfriend now.  She does not feel suicidal homicidal.  She was brought in today by her mom.  Says she was on some medicine for anxiety started with a PE.  It was over a year ago and she weaned herself off of it herself.    This is a  24 y.o. female who presents to the emergency room with the above complaint    PastMedical/Surgical

## 2025-04-23 NOTE — DISCHARGE INSTRUCTIONS
Take prescribed medication as prescribed only for anxiety  Follow-up primary care provider  Take OTC Motrin or Tylenol as needed for headache  Return for any worsening

## 2025-04-23 NOTE — ED NOTES
D/C: Order noted for d/c. Pt confirmed d/c paperwork  have correct name. Discharge and education instructions reviewed with patient. Teach-back successful.  Pt verbalized understanding. Pt denied questions at this time. No acute distress noted. Patient instructed to follow-up as noted - return to emergency department if symptoms worsen. Patient verbalized understanding. Discharged per EDMD with discharge instructions. Pt discharged  to private vehicle. Patient stable upon departure. Thanked patient for choosing [facility] for care.

## 2025-05-06 ENCOUNTER — HOSPITAL ENCOUNTER (EMERGENCY)
Age: 25
Discharge: HOME | End: 2025-05-06
Payer: COMMERCIAL

## 2025-05-06 VITALS
SYSTOLIC BLOOD PRESSURE: 159 MMHG | TEMPERATURE: 98.2 F | DIASTOLIC BLOOD PRESSURE: 81 MMHG | OXYGEN SATURATION: 100 % | RESPIRATION RATE: 20 BRPM | HEART RATE: 88 BPM

## 2025-05-06 DIAGNOSIS — J30.2 SEASONAL ALLERGIES: ICD-10-CM

## 2025-05-06 DIAGNOSIS — Z71.1 CONCERN ABOUT STD IN FEMALE WITHOUT DIAGNOSIS: ICD-10-CM

## 2025-05-06 DIAGNOSIS — R09.81 NASAL CONGESTION: Primary | ICD-10-CM

## 2025-05-06 LAB
BACTERIA GENITAL QL WET PREP: NORMAL
BILIRUB UR QL STRIP.AUTO: NEGATIVE
CLARITY UR: CLEAR
CLUE CELLS SPEC QL WET PREP: NORMAL
COLOR UR: YELLOW
EPI CELLS #/AREA URNS HPF: NORMAL /HPF (ref 0–5)
EPI CELLS SPEC QL WET PREP: NORMAL
FLUAV RNA UPPER RESP QL NAA+PROBE: NEGATIVE
FLUBV AG NPH QL: NEGATIVE
GLUCOSE UR STRIP.AUTO-MCNC: NEGATIVE MG/DL
HCG UR QL: NEGATIVE
HGB UR QL STRIP.AUTO: ABNORMAL
KETONES UR STRIP.AUTO-MCNC: ABNORMAL MG/DL
LEUKOCYTE ESTERASE UR QL STRIP.AUTO: NEGATIVE
NITRITE UR QL STRIP.AUTO: NEGATIVE
PH UR STRIP.AUTO: 6 [PH] (ref 5–8)
PROT UR STRIP.AUTO-MCNC: NEGATIVE MG/DL
RBC #/AREA URNS HPF: NORMAL /HPF (ref 0–4)
RBC SPEC QL WET PREP: NORMAL
SARS-COV-2 RDRP RESP QL NAA+PROBE: NOT DETECTED
SP GR UR STRIP.AUTO: >=1.03 (ref 1–1.03)
SPECIMEN SOURCE FLD: NORMAL
T VAGINALIS GENITAL QL WET PREP: NORMAL
UA COMPLETE W REFLEX CULTURE PNL UR: ABNORMAL
UA DIPSTICK W REFLEX MICRO PNL UR: YES
URN SPEC COLLECT METH UR: ABNORMAL
UROBILINOGEN UR STRIP-ACNC: 0.2 E.U./DL
WBC #/AREA URNS HPF: NORMAL /HPF (ref 0–5)
WBC SPEC QL WET PREP: NORMAL
YEAST GENITAL QL WET PREP: NORMAL

## 2025-05-06 PROCEDURE — 87804 INFLUENZA ASSAY W/OPTIC: CPT

## 2025-05-06 PROCEDURE — 87491 CHLMYD TRACH DNA AMP PROBE: CPT

## 2025-05-06 PROCEDURE — 87635 SARS-COV-2 COVID-19 AMP PRB: CPT

## 2025-05-06 PROCEDURE — 87591 N.GONORRHOEAE DNA AMP PROB: CPT

## 2025-05-06 PROCEDURE — 87210 SMEAR WET MOUNT SALINE/INK: CPT

## 2025-05-06 PROCEDURE — 99283 EMERGENCY DEPT VISIT LOW MDM: CPT

## 2025-05-06 PROCEDURE — 81001 URINALYSIS AUTO W/SCOPE: CPT

## 2025-05-06 PROCEDURE — 84703 CHORIONIC GONADOTROPIN ASSAY: CPT

## 2025-05-06 RX ORDER — PREDNISONE 10 MG/1
20 TABLET ORAL DAILY
Qty: 10 TABLET | Refills: 0 | Status: SHIPPED | OUTPATIENT
Start: 2025-05-06 | End: 2025-05-11

## 2025-05-06 RX ORDER — FEXOFENADINE HCL 180 MG/1
180 TABLET ORAL DAILY
Qty: 30 TABLET | Refills: 0 | Status: SHIPPED | OUTPATIENT
Start: 2025-05-06 | End: 2025-06-05

## 2025-05-06 RX ORDER — FLUTICASONE PROPIONATE 50 MCG
2 SPRAY, SUSPENSION (ML) NASAL DAILY
Qty: 16 G | Refills: 0 | Status: SHIPPED | OUTPATIENT
Start: 2025-05-06

## 2025-05-06 NOTE — ED PROVIDER NOTES
[facility] EMERGENCY DEPARTMENT  EMERGENCY DEPARTMENT ENCOUNTER        Pt Name: Norma Durant  MRN: [id number]  Birthdate 2000  Date of evaluation: 5/6/2025  Provider: Jalen Cox PA-C  PCP: Mike Clemente DO  Note Started: 2:41 PM EDT 5/6/25      NAHED. I have evaluated this patient.        CHIEF COMPLAINT       Chief Complaint   Patient presents with    Nasal Congestion    Exposure to STD       HISTORY OF PRESENT ILLNESS: 1 or more Elements     History From: Patient    Norma Durant is a 25 y.o. female who presents to the Emergency Department with complaint headache yesterday and she has had for the past several days she describes nasal congestion, occasional cough, postnasal drainage, yellow-green nasal discharge.  She does not smoke, vape or utilize marijuana.  Questionable ill exposure.  She does have history of hypertension.  Her current blood pressure is 159/81 with heart rate 88.  Her blood pressure medications include Zestoretic 10/12.5 daily and amlodipine 5 mg daily.    Patient also requesting STD evaluation.  She is concerned as she considers exposure.  She is asymptomatic at this time.    Nursing Notes were all reviewed and agreed with or any disagreements were addressed in the HPI.    REVIEW OF SYSTEMS :      Review of Systems    Positives and Pertinent negatives as per HPI.     SURGICAL HISTORY     Past Surgical History:   Procedure Laterality Date    ABDOMEN SURGERY         CURRENTMEDICATIONS       Previous Medications    AMLODIPINE (NORVASC) 5 MG TABLET    Take 1 tablet by mouth daily    CITALOPRAM (CELEXA) 20 MG TABLET    Take 1 tablet by mouth daily    LISINOPRIL-HYDROCHLOROTHIAZIDE (PRINZIDE;ZESTORETIC) 10-12.5 MG PER TABLET    Take 1 tablet by mouth daily    LURASIDONE (LATUDA) 40 MG TABS TABLET    Take by mouth Daily with supper    RISPERIDONE (RISPERDAL) 1 MG TABLET    Take 1 tablet by mouth 2 times daily       ALLERGIES     Cetirizine and

## 2025-05-06 NOTE — DISCHARGE INSTRUCTIONS
Your wet prep was negative.  UA negative for UTI.  Urine pregnancy negative.  Flu and COVID studies were negative.  GC chlamydia studies are pending.  You did not want treatment but want to wait for the results of the gonorrhea chlamydia study.  At this time nasal congestion and seasonal allergies may be related however you could have a viral process.  I have sent medications to your local pharmacy for prednisone, Flonase and Allegra.

## 2025-05-07 ENCOUNTER — RESULTS FOLLOW-UP (OUTPATIENT)
Dept: EMERGENCY DEPT | Age: 25
End: 2025-05-07

## 2025-05-07 LAB
C TRACH DNA CVX QL NAA+PROBE: NEGATIVE
N GONORRHOEA DNA CERV MUCUS QL NAA+PROBE: NEGATIVE

## 2025-06-27 ENCOUNTER — HOSPITAL ENCOUNTER (EMERGENCY)
Age: 25
Discharge: HOME | End: 2025-06-27
Attending: EMERGENCY MEDICINE

## 2025-06-27 VITALS
DIASTOLIC BLOOD PRESSURE: 87 MMHG | BODY MASS INDEX: 35.03 KG/M2 | RESPIRATION RATE: 16 BRPM | SYSTOLIC BLOOD PRESSURE: 137 MMHG | OXYGEN SATURATION: 100 % | TEMPERATURE: 100 F | HEIGHT: 70 IN | HEART RATE: 96 BPM | WEIGHT: 244.71 LBS

## 2025-06-27 DIAGNOSIS — J02.0 STREPTOCOCCAL SORE THROAT: Primary | ICD-10-CM

## 2025-06-27 DIAGNOSIS — Z71.1 CONCERN ABOUT STD IN FEMALE WITHOUT DIAGNOSIS: ICD-10-CM

## 2025-06-27 DIAGNOSIS — J36 PERITONSILLAR ABSCESS DETERMINED BY EXAMINATION: ICD-10-CM

## 2025-06-27 LAB
ANION GAP SERPL CALCULATED.3IONS-SCNC: 9 MMOL/L (ref 3–16)
BACTERIA GENITAL QL WET PREP: NORMAL
BASOPHILS # BLD: 0.1 K/UL (ref 0–0.2)
BASOPHILS NFR BLD: 0.7 %
BILIRUB UR QL STRIP.AUTO: NEGATIVE
BUN SERPL-MCNC: 10 MG/DL (ref 7–20)
CALCIUM SERPL-MCNC: 8.7 MG/DL (ref 8.3–10.6)
CHLORIDE SERPL-SCNC: 105 MMOL/L (ref 99–110)
CLARITY UR: CLEAR
CLUE CELLS SPEC QL WET PREP: NORMAL
CO2 SERPL-SCNC: 25 MMOL/L (ref 21–32)
COLOR UR: YELLOW
CREAT SERPL-MCNC: 0.8 MG/DL (ref 0.6–1.1)
DEPRECATED RDW RBC AUTO: 12.8 % (ref 12.4–15.4)
EOSINOPHIL # BLD: 0 K/UL (ref 0–0.6)
EOSINOPHIL NFR BLD: 0.1 %
EPI CELLS #/AREA URNS HPF: ABNORMAL /HPF (ref 0–5)
EPI CELLS SPEC QL WET PREP: NORMAL
GFR SERPLBLD CREATININE-BSD FMLA CKD-EPI: >90 ML/MIN/{1.73_M2}
GLUCOSE SERPL-MCNC: 86 MG/DL (ref 70–99)
GLUCOSE UR STRIP.AUTO-MCNC: NEGATIVE MG/DL
HCG UR QL: NEGATIVE
HCT VFR BLD AUTO: 40.1 % (ref 36–48)
HGB BLD-MCNC: 13.4 G/DL (ref 12–16)
HGB UR QL STRIP.AUTO: ABNORMAL
KETONES UR STRIP.AUTO-MCNC: NEGATIVE MG/DL
LEUKOCYTE ESTERASE UR QL STRIP.AUTO: NEGATIVE
LYMPHOCYTES # BLD: 1.2 K/UL (ref 1–5.1)
LYMPHOCYTES NFR BLD: 12.2 %
MCH RBC QN AUTO: 28.4 PG (ref 26–34)
MCHC RBC AUTO-ENTMCNC: 33.5 G/DL (ref 31–36)
MCV RBC AUTO: 84.9 FL (ref 80–100)
MONOCYTES # BLD: 0.8 K/UL (ref 0–1.3)
MONOCYTES NFR BLD: 8.3 %
MUCOUS THREADS #/AREA URNS LPF: ABNORMAL /LPF
NEUTROPHILS # BLD: 7.4 K/UL (ref 1.7–7.7)
NEUTROPHILS NFR BLD: 78.7 %
NITRITE UR QL STRIP.AUTO: NEGATIVE
PH UR STRIP.AUTO: 7.5 [PH] (ref 5–8)
PLATELET # BLD AUTO: 229 K/UL (ref 135–450)
PMV BLD AUTO: 8.8 FL (ref 5–10.5)
POTASSIUM SERPL-SCNC: 3.9 MMOL/L (ref 3.5–5.1)
PROT UR STRIP.AUTO-MCNC: ABNORMAL MG/DL
RBC # BLD AUTO: 4.72 M/UL (ref 4–5.2)
RBC #/AREA URNS HPF: ABNORMAL /HPF (ref 0–4)
RBC SPEC QL WET PREP: NORMAL
S PYO AG THROAT QL: POSITIVE
SODIUM SERPL-SCNC: 139 MMOL/L (ref 136–145)
SP GR UR STRIP.AUTO: 1.02 (ref 1–1.03)
SPECIMEN SOURCE FLD: NORMAL
T VAGINALIS GENITAL QL WET PREP: NORMAL
TRICHOMONAS #/AREA URNS HPF: ABNORMAL /HPF
UA COMPLETE W REFLEX CULTURE PNL UR: ABNORMAL
UA DIPSTICK W REFLEX MICRO PNL UR: YES
URN SPEC COLLECT METH UR: ABNORMAL
UROBILINOGEN UR STRIP-ACNC: 1 E.U./DL
WBC # BLD AUTO: 9.5 K/UL (ref 4–11)
WBC #/AREA URNS HPF: ABNORMAL /HPF (ref 0–5)
WBC SPEC QL WET PREP: NORMAL
YEAST GENITAL QL WET PREP: NORMAL

## 2025-06-27 PROCEDURE — 99284 EMERGENCY DEPT VISIT MOD MDM: CPT

## 2025-06-27 PROCEDURE — 87591 N.GONORRHOEAE DNA AMP PROB: CPT

## 2025-06-27 PROCEDURE — 87491 CHLMYD TRACH DNA AMP PROBE: CPT

## 2025-06-27 PROCEDURE — 6360000002 HC RX W HCPCS

## 2025-06-27 PROCEDURE — 6360000002 HC RX W HCPCS: Performed by: EMERGENCY MEDICINE

## 2025-06-27 PROCEDURE — 2580000003 HC RX 258

## 2025-06-27 PROCEDURE — 87210 SMEAR WET MOUNT SALINE/INK: CPT

## 2025-06-27 PROCEDURE — 6370000000 HC RX 637 (ALT 250 FOR IP)

## 2025-06-27 PROCEDURE — 87880 STREP A ASSAY W/OPTIC: CPT

## 2025-06-27 PROCEDURE — 80048 BASIC METABOLIC PNL TOTAL CA: CPT

## 2025-06-27 PROCEDURE — 36415 COLL VENOUS BLD VENIPUNCTURE: CPT

## 2025-06-27 PROCEDURE — 96375 TX/PRO/DX INJ NEW DRUG ADDON: CPT

## 2025-06-27 PROCEDURE — 85025 COMPLETE CBC W/AUTO DIFF WBC: CPT

## 2025-06-27 PROCEDURE — 81001 URINALYSIS AUTO W/SCOPE: CPT

## 2025-06-27 PROCEDURE — 84703 CHORIONIC GONADOTROPIN ASSAY: CPT

## 2025-06-27 RX ORDER — 0.9 % SODIUM CHLORIDE 0.9 %
1000 INTRAVENOUS SOLUTION INTRAVENOUS ONCE
Status: COMPLETED | OUTPATIENT
Start: 2025-06-27 | End: 2025-06-27

## 2025-06-27 RX ORDER — IBUPROFEN 400 MG/1
400 TABLET, FILM COATED ORAL ONCE
Status: DISCONTINUED | OUTPATIENT
Start: 2025-06-27 | End: 2025-06-27

## 2025-06-27 RX ORDER — KETOROLAC TROMETHAMINE 15 MG/ML
15 INJECTION, SOLUTION INTRAMUSCULAR; INTRAVENOUS ONCE
Status: COMPLETED | OUTPATIENT
Start: 2025-06-27 | End: 2025-06-27

## 2025-06-27 RX ORDER — DEXAMETHASONE 4 MG/1
8 TABLET ORAL ONCE
Status: DISCONTINUED | OUTPATIENT
Start: 2025-06-27 | End: 2025-06-27

## 2025-06-27 RX ORDER — ACETAMINOPHEN 500 MG
500 TABLET ORAL 4 TIMES DAILY PRN
Qty: 20 TABLET | Refills: 0 | Status: SHIPPED | OUTPATIENT
Start: 2025-06-27 | End: 2025-07-02

## 2025-06-27 RX ORDER — DEXAMETHASONE SODIUM PHOSPHATE 4 MG/ML
8 INJECTION, SOLUTION INTRA-ARTICULAR; INTRALESIONAL; INTRAMUSCULAR; INTRAVENOUS; SOFT TISSUE ONCE
Status: COMPLETED | OUTPATIENT
Start: 2025-06-27 | End: 2025-06-27

## 2025-06-27 RX ORDER — IBUPROFEN 400 MG/1
400 TABLET, FILM COATED ORAL EVERY 6 HOURS PRN
Qty: 120 TABLET | Refills: 0 | Status: SHIPPED | OUTPATIENT
Start: 2025-06-27

## 2025-06-27 RX ORDER — ACETAMINOPHEN 325 MG/1
650 TABLET ORAL ONCE
Status: COMPLETED | OUTPATIENT
Start: 2025-06-27 | End: 2025-06-27

## 2025-06-27 RX ADMIN — SODIUM CHLORIDE 1000 ML: 0.9 INJECTION, SOLUTION INTRAVENOUS at 14:35

## 2025-06-27 RX ADMIN — ACETAMINOPHEN 650 MG: 325 TABLET ORAL at 15:04

## 2025-06-27 RX ADMIN — SODIUM CHLORIDE 3000 MG: 9 INJECTION, SOLUTION INTRAVENOUS at 14:51

## 2025-06-27 RX ADMIN — DEXAMETHASONE SODIUM PHOSPHATE 8 MG: 4 INJECTION, SOLUTION INTRAMUSCULAR; INTRAVENOUS at 14:43

## 2025-06-27 RX ADMIN — KETOROLAC TROMETHAMINE 15 MG: 15 INJECTION, SOLUTION INTRAMUSCULAR; INTRAVENOUS at 14:44

## 2025-06-27 ASSESSMENT — PAIN SCALES - GENERAL
PAINLEVEL_OUTOF10: 5
PAINLEVEL_OUTOF10: 8
PAINLEVEL_OUTOF10: 0

## 2025-06-27 ASSESSMENT — PAIN DESCRIPTION - LOCATION: LOCATION: GENERALIZED;THROAT

## 2025-06-27 ASSESSMENT — PAIN DESCRIPTION - PAIN TYPE: TYPE: ACUTE PAIN

## 2025-06-27 ASSESSMENT — LIFESTYLE VARIABLES
HOW MANY STANDARD DRINKS CONTAINING ALCOHOL DO YOU HAVE ON A TYPICAL DAY: 1 OR 2
HOW OFTEN DO YOU HAVE A DRINK CONTAINING ALCOHOL: MONTHLY OR LESS

## 2025-06-27 ASSESSMENT — PAIN DESCRIPTION - DESCRIPTORS: DESCRIPTORS: ACHING;SORE

## 2025-06-27 ASSESSMENT — PAIN - FUNCTIONAL ASSESSMENT: PAIN_FUNCTIONAL_ASSESSMENT: 0-10

## 2025-06-27 NOTE — DISCHARGE INSTRUCTIONS
If you are experiencing any kind of severe pain, alternating Tylenol and Motrin can provide more relief than taking either medication alone.  When alternating these medications, start by taking 600 mg of Motrin, and after 4 hours, take 500 mg of Tylenol.  If this does not completely relieve the pain, repeat this cycle up to the maximum dose of each medicine per day.    Please take the antibiotic(s), as prescribed. Do not stop taking the medication early, even if you feel entirely well.     THANK YOU for allowing me and the rest of the Emergency Department staff at [facility] to care for you today. We hope that your care has been nothing short of EXCELLENT today. In the near future you may receive a survey in the mail about your visit. Please fill this survey out and return it so that we can continue to provide you with EXCELLENT care.

## 2025-06-27 NOTE — ED PROVIDER NOTES
In addition to the advanced practice provider, I personally saw Norma Durant and performed a substantive portion of the visit including all aspects of the medical decision making. I made/approved the management plan and take responsibility for the patient management    Briefly, this is a 25 y.o. female here for sore throat.  Associated with subjective fever, chills and generalized bodyaches.  Has had some painful swallowing, however able to tolerate food and medications.  No known sick contacts.  Symptoms began yesterday evening and have worsened into this morning.  Patient also requesting screening for STIs today, although she denies any  symptoms.    On exam, patient febrile however nontoxic. No distress. Heart tachycardic, regular rhythm. Lungs CTAB. Abdomen soft, nondistended, nontender to palpation in all quadrants.  Posterior oropharynx is injected with right tonsillar hypertrophy and overlying exudate.  There is mild leftward displacement of the uvula, however speech is very clear, patient tolerating oral secretions without difficulty, there is no trismus.  No cervical soft tissue or submental edema.        Screenings   [place] Coma Scale  Eye Opening: Spontaneous  Best Verbal Response: Oriented  Best Motor Response: Obeys commands  [place] Coma Scale Score: 15          MDM    Patient febrile however overall well-appearing, alert and conversant.  She is in no painful or respiratory distress.  Exam reveals right tonsillar hypertrophy and exudate with minimal leftward uvula deviation, however speech is clear and there is no evidence of impending upper airway obstruction.  She is tolerating her oral secretions without any difficulty.  No clinical findings to suggest [place].  Laboratory evaluation is reassuring, no leukocytosis, no evidence of acute endorgan dysfunction or clinically significant electrolyte derangement.  Patient requested STI screening which was performed and results pending, will defer

## 2025-06-30 LAB
C TRACH DNA CVX QL NAA+PROBE: NEGATIVE
N GONORRHOEA DNA CERV MUCUS QL NAA+PROBE: NEGATIVE

## 2025-08-07 ENCOUNTER — HOSPITAL ENCOUNTER (EMERGENCY)
Age: 25
Discharge: HOME | End: 2025-08-07
Attending: EMERGENCY MEDICINE
Payer: COMMERCIAL

## 2025-08-07 VITALS
RESPIRATION RATE: 16 BRPM | SYSTOLIC BLOOD PRESSURE: 159 MMHG | HEIGHT: 69 IN | HEART RATE: 88 BPM | DIASTOLIC BLOOD PRESSURE: 97 MMHG | BODY MASS INDEX: 35.46 KG/M2 | WEIGHT: 239.42 LBS | TEMPERATURE: 98.6 F | OXYGEN SATURATION: 99 %

## 2025-08-07 DIAGNOSIS — N89.8 VAGINAL ODOR: ICD-10-CM

## 2025-08-07 DIAGNOSIS — Z20.2 STD EXPOSURE: ICD-10-CM

## 2025-08-07 DIAGNOSIS — A59.01 TRICHOMONAS VAGINALIS (TV) INFECTION: Primary | ICD-10-CM

## 2025-08-07 LAB
BACTERIA GENITAL QL WET PREP: ABNORMAL
BACTERIA URNS QL MICRO: ABNORMAL /HPF
BILIRUB UR QL STRIP.AUTO: NEGATIVE
CLARITY UR: CLEAR
CLUE CELLS SPEC QL WET PREP: ABNORMAL
COLOR UR: YELLOW
EPI CELLS #/AREA URNS HPF: ABNORMAL /HPF (ref 0–5)
EPI CELLS SPEC QL WET PREP: ABNORMAL
GLUCOSE UR STRIP.AUTO-MCNC: NEGATIVE MG/DL
HCG UR QL: NEGATIVE
HGB UR QL STRIP.AUTO: NEGATIVE
KETONES UR STRIP.AUTO-MCNC: ABNORMAL MG/DL
LEUKOCYTE ESTERASE UR QL STRIP.AUTO: ABNORMAL
NITRITE UR QL STRIP.AUTO: NEGATIVE
PH UR STRIP.AUTO: 6 [PH] (ref 5–8)
PROT UR STRIP.AUTO-MCNC: 30 MG/DL
RBC #/AREA URNS HPF: ABNORMAL /HPF (ref 0–4)
RBC SPEC QL WET PREP: ABNORMAL
SP GR UR STRIP.AUTO: >=1.03 (ref 1–1.03)
SPECIMEN SOURCE FLD: ABNORMAL
T VAGINALIS GENITAL QL WET PREP: ABNORMAL
TRICHOMONAS #/AREA URNS HPF: PRESENT /HPF
UA COMPLETE W REFLEX CULTURE PNL UR: ABNORMAL
UA DIPSTICK W REFLEX MICRO PNL UR: YES
URN SPEC COLLECT METH UR: ABNORMAL
UROBILINOGEN UR STRIP-ACNC: 1 E.U./DL
WBC #/AREA URNS HPF: ABNORMAL /HPF (ref 0–5)
WBC SPEC QL WET PREP: ABNORMAL
YEAST GENITAL QL WET PREP: ABNORMAL

## 2025-08-07 PROCEDURE — 84703 CHORIONIC GONADOTROPIN ASSAY: CPT

## 2025-08-07 PROCEDURE — 96372 THER/PROPH/DIAG INJ SC/IM: CPT

## 2025-08-07 PROCEDURE — 87491 CHLMYD TRACH DNA AMP PROBE: CPT

## 2025-08-07 PROCEDURE — 6370000000 HC RX 637 (ALT 250 FOR IP): Performed by: EMERGENCY MEDICINE

## 2025-08-07 PROCEDURE — 87210 SMEAR WET MOUNT SALINE/INK: CPT

## 2025-08-07 PROCEDURE — 87591 N.GONORRHOEAE DNA AMP PROB: CPT

## 2025-08-07 PROCEDURE — 81001 URINALYSIS AUTO W/SCOPE: CPT

## 2025-08-07 PROCEDURE — 6360000002 HC RX W HCPCS: Performed by: EMERGENCY MEDICINE

## 2025-08-07 PROCEDURE — 99284 EMERGENCY DEPT VISIT MOD MDM: CPT

## 2025-08-07 RX ORDER — METRONIDAZOLE 250 MG/1
250 TABLET ORAL 3 TIMES DAILY
Qty: 21 TABLET | Refills: 0 | Status: SHIPPED | OUTPATIENT
Start: 2025-08-07 | End: 2025-08-14

## 2025-08-07 RX ORDER — DOXYCYCLINE HYCLATE 100 MG
100 TABLET ORAL ONCE
Status: COMPLETED | OUTPATIENT
Start: 2025-08-07 | End: 2025-08-07

## 2025-08-07 RX ORDER — DOXYCYCLINE HYCLATE 100 MG
100 TABLET ORAL 2 TIMES DAILY
Qty: 14 TABLET | Refills: 0 | Status: SHIPPED | OUTPATIENT
Start: 2025-08-07 | End: 2025-08-14

## 2025-08-07 RX ORDER — METRONIDAZOLE 500 MG/1
500 TABLET ORAL ONCE
Status: COMPLETED | OUTPATIENT
Start: 2025-08-07 | End: 2025-08-07

## 2025-08-07 RX ADMIN — LIDOCAINE HYDROCHLORIDE 500 MG: 10 INJECTION, SOLUTION EPIDURAL; INFILTRATION; INTRACAUDAL; PERINEURAL at 10:44

## 2025-08-07 RX ADMIN — DOXYCYCLINE HYCLATE 100 MG: 100 TABLET, FILM COATED ORAL at 10:44

## 2025-08-07 RX ADMIN — METRONIDAZOLE 500 MG: 500 TABLET ORAL at 10:44

## 2025-08-07 ASSESSMENT — PAIN - FUNCTIONAL ASSESSMENT: PAIN_FUNCTIONAL_ASSESSMENT: NONE - DENIES PAIN

## 2025-08-08 LAB
C TRACH DNA CVX QL NAA+PROBE: NEGATIVE
N GONORRHOEA DNA CERV MUCUS QL NAA+PROBE: NEGATIVE